# Patient Record
Sex: FEMALE | Race: WHITE | NOT HISPANIC OR LATINO | Employment: OTHER | URBAN - METROPOLITAN AREA
[De-identification: names, ages, dates, MRNs, and addresses within clinical notes are randomized per-mention and may not be internally consistent; named-entity substitution may affect disease eponyms.]

---

## 2017-09-13 ENCOUNTER — TRANSFERRED RECORDS (OUTPATIENT)
Dept: HEALTH INFORMATION MANAGEMENT | Facility: CLINIC | Age: 67
End: 2017-09-13

## 2017-09-19 ENCOUNTER — TRANSFERRED RECORDS (OUTPATIENT)
Dept: HEALTH INFORMATION MANAGEMENT | Facility: CLINIC | Age: 67
End: 2017-09-19

## 2017-09-20 ENCOUNTER — TRANSFERRED RECORDS (OUTPATIENT)
Dept: HEALTH INFORMATION MANAGEMENT | Facility: CLINIC | Age: 67
End: 2017-09-20

## 2017-09-25 ENCOUNTER — PRE VISIT (OUTPATIENT)
Dept: RADIATION ONCOLOGY | Facility: CLINIC | Age: 67
End: 2017-09-25

## 2017-09-25 NOTE — TELEPHONE ENCOUNTER
1.  Date/reason for appt: 10/3/2017, recurrent endometrial cancer    2.  Referring provider: Dr. Bullock    3.  Call to patient (Yes / No - short description): Yes, called patient to schedule consult    4.  Previous care at / records requested from: [facility] (consult only), [facility] (slides, surgery), [facility] (Clinic notes, biopsy) [facility]

## 2017-09-25 NOTE — TELEPHONE ENCOUNTER
Imaging from [facility] in pacs viewer - reports sent to HIM for scanning.

## 2017-09-26 DIAGNOSIS — C54.1 ENDOMETRIAL CANCER (H): Primary | ICD-10-CM

## 2017-09-28 NOTE — TELEPHONE ENCOUNTER
Received records from [facility] - sent to HIM for scanning. Also emailed to MD for review.

## 2017-09-29 NOTE — TELEPHONE ENCOUNTER
Received records from [facility] - sent to HIM for scanning. [facility] won't release records without signed DONYA. Patient will go over to [facility] clinic and sign release for them to send us records.

## 2017-10-02 PROCEDURE — 00000346 ZZHCL STATISTIC REVIEW OUTSIDE SLIDES TC 88321: Performed by: RADIOLOGY

## 2017-10-02 NOTE — TELEPHONE ENCOUNTER
Slides received from [facility] - sent to pathology for review. Records received from [facility] - sent to HIM for scanning.

## 2017-10-03 ENCOUNTER — OFFICE VISIT (OUTPATIENT)
Dept: RADIATION ONCOLOGY | Facility: CLINIC | Age: 67
End: 2017-10-03
Attending: RADIOLOGY
Payer: COMMERCIAL

## 2017-10-03 ENCOUNTER — HOSPITAL ENCOUNTER (OUTPATIENT)
Dept: MRI IMAGING | Facility: CLINIC | Age: 67
Discharge: HOME | End: 2017-10-03
Attending: RADIOLOGY | Admitting: RADIOLOGY
Payer: COMMERCIAL

## 2017-10-03 VITALS
WEIGHT: 254 LBS | BODY MASS INDEX: 38.49 KG/M2 | DIASTOLIC BLOOD PRESSURE: 71 MMHG | HEIGHT: 68 IN | SYSTOLIC BLOOD PRESSURE: 182 MMHG

## 2017-10-03 DIAGNOSIS — C54.1 ENDOMETRIAL CANCER (H): Primary | ICD-10-CM

## 2017-10-03 DIAGNOSIS — C54.1 ENDOMETRIAL CANCER (H): ICD-10-CM

## 2017-10-03 PROCEDURE — 99215 OFFICE O/P EST HI 40 MIN: CPT | Mod: 25 | Performed by: RADIOLOGY

## 2017-10-03 PROCEDURE — 25000128 H RX IP 250 OP 636: Performed by: RADIOLOGY

## 2017-10-03 PROCEDURE — A9585 GADOBUTROL INJECTION: HCPCS | Performed by: RADIOLOGY

## 2017-10-03 PROCEDURE — 72197 MRI PELVIS W/O & W/DYE: CPT

## 2017-10-03 RX ORDER — METFORMIN HCL 500 MG
500 TABLET, EXTENDED RELEASE 24 HR ORAL
COMMUNITY

## 2017-10-03 RX ORDER — PAROXETINE 40 MG/1
40 TABLET, FILM COATED ORAL
COMMUNITY
Start: 2017-05-16 | End: 2017-10-03

## 2017-10-03 RX ORDER — HYDROCHLOROTHIAZIDE 25 MG/1
25 TABLET ORAL
COMMUNITY
Start: 2017-02-17 | End: 2017-10-03

## 2017-10-03 RX ORDER — GADOBUTROL 604.72 MG/ML
10 INJECTION INTRAVENOUS ONCE
Status: COMPLETED | OUTPATIENT
Start: 2017-10-03 | End: 2017-10-03

## 2017-10-03 RX ORDER — HYDROCHLOROTHIAZIDE 12.5 MG/1
25 CAPSULE ORAL DAILY
COMMUNITY

## 2017-10-03 RX ORDER — METOPROLOL TARTRATE 50 MG
25 TABLET ORAL
COMMUNITY
Start: 2017-02-17 | End: 2017-10-03

## 2017-10-03 RX ORDER — ROSUVASTATIN CALCIUM 10 MG/1
10 TABLET, COATED ORAL
COMMUNITY
Start: 2017-05-18 | End: 2017-10-03

## 2017-10-03 RX ORDER — INSULIN GLARGINE 100 [IU]/ML
INJECTION, SOLUTION SUBCUTANEOUS
COMMUNITY
Start: 2017-04-02 | End: 2017-10-03

## 2017-10-03 RX ORDER — ASPIRIN 81 MG/1
TABLET ORAL
COMMUNITY
Start: 2006-12-12 | End: 2017-10-03

## 2017-10-03 RX ORDER — LEVOTHYROXINE SODIUM 25 UG/1
25 TABLET ORAL
COMMUNITY
Start: 2017-05-16 | End: 2017-10-03

## 2017-10-03 RX ORDER — METFORMIN HCL 500 MG
500 TABLET, EXTENDED RELEASE 24 HR ORAL
COMMUNITY
Start: 2017-05-16 | End: 2017-10-03

## 2017-10-03 RX ORDER — LORAZEPAM 0.5 MG/1
TABLET ORAL
COMMUNITY
Start: 2017-07-07 | End: 2017-10-03

## 2017-10-03 RX ORDER — MULTIVITAMIN
TABLET ORAL
COMMUNITY
Start: 2007-06-27 | End: 2017-10-03

## 2017-10-03 RX ORDER — LOSARTAN POTASSIUM 50 MG/1
50 TABLET ORAL
COMMUNITY
Start: 2017-05-30 | End: 2017-10-03

## 2017-10-03 RX ADMIN — SODIUM CHLORIDE 100 ML: 9 INJECTION, SOLUTION INTRAVENOUS at 07:59

## 2017-10-03 RX ADMIN — GADOBUTROL 10 ML: 604.72 INJECTION INTRAVENOUS at 07:50

## 2017-10-03 ASSESSMENT — ENCOUNTER SYMPTOMS
NERVOUS/ANXIOUS: 1
ORTHOPNEA: 0
BACK PAIN: 0
NAUSEA: 0
TREMORS: 0
SEIZURES: 0
DOUBLE VISION: 0
ABDOMINAL PAIN: 0
BLOOD IN STOOL: 0
SHORTNESS OF BREATH: 0
SPEECH CHANGE: 0
HEMATURIA: 0
WEIGHT LOSS: 0
MYALGIAS: 0
PND: 0
PHOTOPHOBIA: 0
CONSTIPATION: 0
HEMOPTYSIS: 0
TINGLING: 0
POLYDIPSIA: 0
VOMITING: 0
SINUS PAIN: 0
DIZZINESS: 1
NECK PAIN: 0
MEMORY LOSS: 0
INSOMNIA: 0
STRIDOR: 0
DEPRESSION: 1
LOSS OF CONSCIOUSNESS: 0
FALLS: 0
FLANK PAIN: 0
BRUISES/BLEEDS EASILY: 0
FEVER: 0
WEAKNESS: 0
PALPITATIONS: 0
HALLUCINATIONS: 0
CLAUDICATION: 0
EYE DISCHARGE: 0
HEARTBURN: 0
CHILLS: 0
DIAPHORESIS: 0
HEADACHES: 0
SPUTUM PRODUCTION: 0
COUGH: 0
FREQUENCY: 0
BLURRED VISION: 0
SENSORY CHANGE: 0
DIARRHEA: 0
SORE THROAT: 0
ROS GI COMMENTS: GASSY
DYSURIA: 0
EYE PAIN: 0
WHEEZING: 0
EYE REDNESS: 0
FOCAL WEAKNESS: 0

## 2017-10-03 ASSESSMENT — LIFESTYLE VARIABLES: SUBSTANCE_ABUSE: 0

## 2017-10-03 NOTE — MR AVS SNAPSHOT
"              After Visit Summary   10/3/2017    Ginger Diaz    MRN: [id number]           Patient Information     Date Of Birth          1950        Visit Information        Provider Department      10/3/2017 1:30 PM Ema Valera MD Radiation Oncology Clinic        Today's Diagnoses     Endometrial cancer (H)    -  1       Follow-ups after your visit        Who to contact     Please call your clinic at [phone] to:    Ask questions about your health    Make or cancel appointments    Discuss your medicines    Learn about your test results    Speak to your doctor   If you have compliments or concerns about an experience at your clinic, or if you wish to file a complaint, please contact [facility] Patient Relations at [phone] or email us at [email or web address]         Additional Information About Your Visit        MyChart Information     Spotie is an electronic gateway that provides easy, online access to your medical records. With Spotie, you can request a clinic appointment, read your test results, renew a prescription or communicate with your care team.     To sign up for [employer] visit the website at [email or web address]   You will be asked to enter the access code listed below, as well as some personal information. Please follow the directions to create your username and password.     Your access code is: [email or web address]  Expires: 2018 10:36 AM     Your access code will  in 90 days. If you need help or a new code, please contact your [facility] or call [phone] for assistance.        Care EveryWhere ID     This is your Care EveryWhere ID. This could be used by other organizations to access your [facility] medical records  [id number]        Your Vitals Were     Height BMI (Body Mass Index)                1.727 m (5' 8\") 38.62 kg/m2           Blood Pressure from Last 3 Encounters:   10/03/17 182/71    " Weight from Last 3 Encounters:   10/03/17 115.2 kg (254 lb)              Today, you had the following     No orders found for display         Today's Medication Changes          These changes are accurate as of: 10/3/17 11:59 PM.  If you have any questions, ask your nurse or doctor.               These medicines have changed or have updated prescriptions.        Dose/Directions    ATIVAN PO   This may have changed:  Another medication with the same name was removed. Continue taking this medication, and follow the directions you see here.   Changed by:  Ema Valera MD        Dose:  0.5 mg   Take 0.5 mg by mouth as needed for anxiety   Refills:  0       COZAAR PO   This may have changed:  Another medication with the same name was removed. Continue taking this medication, and follow the directions you see here.   Changed by:  Ema Valera MD        Dose:  50 mg   Take 50 mg by mouth   Refills:  0       hydrochlorothiazide 12.5 MG capsule   Commonly known as:  MICROZIDE   This may have changed:  Another medication with the same name was removed. Continue taking this medication, and follow the directions you see here.   Changed by:  Ema Valera MD        Dose:  25 mg   Take 25 mg by mouth daily   Refills:  0       LANTUS SOLOSTAR SC   This may have changed:  Another medication with the same name was removed. Continue taking this medication, and follow the directions you see here.   Changed by:  Ema Valera MD        Dose:  30 Units   Inject 30 Units Subcutaneous   Refills:  0       metFORMIN 500 MG 24 hr tablet   Commonly known as:  GLUCOPHAGE-XR   This may have changed:  Another medication with the same name was removed. Continue taking this medication, and follow the directions you see here.   Changed by:  Ema Valera MD        Dose:  500 mg   Take 500 mg by mouth 2 times daily (with meals)   Refills:  0       METOPROLOL TARTRATE PO   This may have changed:  Another  medication with the same name was removed. Continue taking this medication, and follow the directions you see here.   Changed by:  Ema Valera MD        Dose:  50 mg   Take 50 mg by mouth   Refills:  0       MULTIPLE VITAMIN PO   This may have changed:  Another medication with the same name was removed. Continue taking this medication, and follow the directions you see here.   Changed by:  Ema Valera MD        Refills:  0       PAXIL PO   This may have changed:  Another medication with the same name was removed. Continue taking this medication, and follow the directions you see here.   Changed by:  Ema Valera MD        Dose:  40 mg   Take 40 mg by mouth daily   Refills:  0         Stop taking these medicines if you haven't already. Please contact your care team if you have questions.     aspirin EC 81 MG EC tablet   Stopped by:  Ema Valera MD           levothyroxine 25 MCG tablet   Commonly known as:  SYNTHROID/LEVOTHROID   Stopped by:  Ema Valera MD           rosuvastatin 10 MG tablet   Commonly known as:  CRESTOR   Stopped by:  Ema Valera MD                    Primary Care Provider Office Phone # Fax #    Casey Baez [phone]       [facility] [address] [place] [address]        Equal Access to Services     [facility] HospitalPRINCESS AH: Hadii tricia vides hadasho Soomaali, waaxda luqadaha, qaybta kaalmada adeegyada, josé luis dillon. So [facility] [phone].    ATENCIÓN: Si habla español, tiene a jenkins disposición servicios gratuitos de asistencia lingüística. Llame al [phone].    We comply with applicable federal civil rights laws and [place] laws. We do not discriminate on the basis of race, color, national origin, age, disability, sex, sexual orientation, or gender identity.            Thank you!     Thank you for choosing [facility]  for your care. Our goal is always to provide you  with excellent care. Hearing back from our patients is one way we can continue to improve our services. Please take a few minutes to complete the written survey that you may receive in the mail after your visit with us. Thank you!             Your Updated Medication List - Protect others around you: Learn how to safely use, store and throw away your medicines at www.disposemymeds.org.          This list is accurate as of: 10/3/17 11:59 PM.  Always use your most recent med list.                   Brand Name Dispense Instructions for use Diagnosis    ASPIRIN PO      Take 81 mg by mouth        ATIVAN PO      Take 0.5 mg by mouth as needed for anxiety        COZAAR PO      Take 50 mg by mouth        hydrochlorothiazide 12.5 MG capsule    MICROZIDE     Take 25 mg by mouth daily        LAMICTAL PO      Take 100 mg by mouth 2 times daily 100 mg in the AM, 200 mg at HS        LANTUS SOLOSTAR SC      Inject 30 Units Subcutaneous        metFORMIN 500 MG 24 hr tablet    GLUCOPHAGE-XR     Take 500 mg by mouth 2 times daily (with meals)        METOPROLOL TARTRATE PO      Take 50 mg by mouth        MULTIPLE VITAMIN PO           PAXIL PO      Take 40 mg by mouth daily

## 2017-10-03 NOTE — PROGRESS NOTES
HPI  INITIAL PATIENT ASSESSMENT    Diagnosis: Endometrial Cancer    Prior radiation therapy: None    Prior chemotherapy: None    Prior hormonal therapy:No    Pain Eval:  Denies    Psychosocial  Living arrangements: Lives with   Fall Risk: independent   referral needs: Not needed    Advanced Directive: No  Implantable Cardiac Device? No    Onset of menarche: 12  LMP: No LMP recorded.  Onset of menopause: 50  Abnormal vaginal bleeding/discharge: No  Are you pregnant? No      Nurse face-to-face time: Level 5:  over 15 min face to face time    Review of Systems   Constitutional: Positive for malaise/fatigue. Negative for chills, diaphoresis, fever and weight loss.   HENT: Positive for hearing loss. Negative for congestion, ear discharge, ear pain, nosebleeds, sinus pain, sore throat and tinnitus.         Left ear   Eyes: Negative for blurred vision, double vision, photophobia, pain, discharge and redness.        Cataract surgery left eye   Respiratory: Negative for cough, hemoptysis, sputum production, shortness of breath, wheezing and stridor.    Cardiovascular: Negative for chest pain, palpitations, orthopnea, claudication, leg swelling and PND.   Gastrointestinal: Negative for abdominal pain, blood in stool, constipation, diarrhea, heartburn, melena, nausea and vomiting.        Gassy   Genitourinary: Negative for dysuria, flank pain, frequency, hematuria and urgency.   Musculoskeletal: Negative for back pain, falls, joint pain, myalgias and neck pain.   Skin: Negative for itching and rash.   Neurological: Positive for dizziness. Negative for tingling, tremors, sensory change, speech change, focal weakness, seizures, loss of consciousness, weakness and headaches.        Menieres   Endo/Heme/Allergies: Negative for environmental allergies and polydipsia. Does not bruise/bleed easily.   Psychiatric/Behavioral: Positive for depression. Negative for hallucinations, memory loss, substance abuse and  suicidal ideas. The patient is nervous/anxious. The patient does not have insomnia.

## 2017-10-03 NOTE — LETTER
10/3/2017       RE: Ginger Diaz  [address]  [place] [address]     Dear Colleague,    Thank you for referring your patient, Ginger Diaz, to the [facility]. Please see a copy of my visit note below.      HPI  INITIAL PATIENT ASSESSMENT    Diagnosis: Endometrial Cancer    Prior radiation therapy: None    Prior chemotherapy: None    Prior hormonal therapy:No    Pain Eval:  Denies    Psychosocial  Living arrangements: Lives with   Fall Risk: independent   referral needs: Not needed    Advanced Directive: No  Implantable Cardiac Device? No    Onset of menarche: 12  LMP: No LMP recorded.  Onset of menopause: 50  Abnormal vaginal bleeding/discharge: No  Are you pregnant? No      Nurse face-to-face time: Level 5:  over 15 min face to face time    Review of Systems   Constitutional: Positive for malaise/fatigue. Negative for chills, diaphoresis, fever and weight loss.   HENT: Positive for hearing loss. Negative for congestion, ear discharge, ear pain, nosebleeds, sinus pain, sore throat and tinnitus.         Left ear   Eyes: Negative for blurred vision, double vision, photophobia, pain, discharge and redness.        Cataract surgery left eye   Respiratory: Negative for cough, hemoptysis, sputum production, shortness of breath, wheezing and stridor.    Cardiovascular: Negative for chest pain, palpitations, orthopnea, claudication, leg swelling and PND.   Gastrointestinal: Negative for abdominal pain, blood in stool, constipation, diarrhea, heartburn, melena, nausea and vomiting.        Gassy   Genitourinary: Negative for dysuria, flank pain, frequency, hematuria and urgency.   Musculoskeletal: Negative for back pain, falls, joint pain, myalgias and neck pain.   Skin: Negative for itching and rash.   Neurological: Positive for dizziness. Negative for tingling, tremors, sensory change, speech change, focal weakness, seizures, loss of consciousness, weakness and headaches.         Menieres   Endo/Heme/Allergies: Negative for environmental allergies and polydipsia. Does not bruise/bleed easily.   Psychiatric/Behavioral: Positive for depression. Negative for hallucinations, memory loss, substance abuse and suicidal ideas. The patient is nervous/anxious. The patient does not have insomnia.                  RADIATION ONCOLOGY CONSULT NOTE  Date of Visit: Oct 3, 2017    Ginger Diaz  MRN: [id number]  : 1950    Ginger Diaz is being seen today for initial consultation at the request of Dr. Cristian Bullock for consideration of radiation therapy for recurrent endometrial cancer involving the vaginal cuff. All pertinent labs, imaging, and pathology findings have been reviewed.     HISTORY OF PRESENT ILLNESS:  Ms. 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 She was initially diagnosed in  and was treated with robotic-assisted total laparoscopic hysterectomy, bilateral salpingo-oophorectomy, and pelvic lymph node dissection on 2011.  Pathology revealed grade 1 endometrioid adenocarcinoma of the endometrium, measuring up to 4.5 cm in size, with 1.5 of 2.1 cm myometrial invasion, and with focal lymphovascular space invasion. 0 of 20 pelvic lymph nodes were involved with carcinoma. Margins were negative by at least 2 cm, and peritoneal washings were negative for malignancy.    Given her age, the size of the tumor, lymphovascular space invasion, and deep myometrial invasion, she was recommended to undergo adjuvant therapy with vaginal cuff brachytherapy. However, she declined adjuvant radiation therapy. Approximately 2-3 months ago, she reports that she developed vaginal spotting and intermittent abdominal and low back pain. She saw Dr. Greta Seay on 17, and pelvic exam revealed a polypoid lesion in the vaginal cuff. Dr. Seay biopsy this lesion and pathology was consistent with FIGO grade 1 recurrent  endometrial cancer. She underwent CT chest abdomen and pelvis on 9/13/17, which showed a 5.5 x 4 x 6 cm mass at the vaginal cuff with abutment of the bladder and possible invasion and abutment of the sigmoid colon.    She met with Dr. Goldy Bullock of [facility] radiation oncology on 9/19/2017. Dr. Bullock recommended PET/CT and MRI of the pelvis to complete staging, and discussed the possibility of treatment with surgery with possible adjuvant radiation versus definitive chemoradiation using external beam and a brachytherapy boost. She was referred to our clinic for discussion of brachytherapy options. She underwent PET CT scan on 9/20/2017, which showed an irregular infiltrating soft tissue mass in the deep pelvis at the vaginal cuff intensely FDG avid, consistent with recurrent/residual malignancy.  The mass was seen to directly abut the wall of the bladder, as well as a short segment of sigmoid colon. Adherence or invasion into the sigmoid colon cannot be ruled out. No concerning lymph nodes or lesions consistent with metastatic disease were seen.    She underwent MRI of the pelvis earlier today, which again showed the vaginal cuff mass extending into the superior third of the vagina.  Again it was visualized to abut the posterior superior wall of the urinary bladder and a short segment of the adjacent sigmoid colon. MRI does show evidence of tethering with loss of intervening fat planes, however mucosal involvement is unclear.      On our visit today, she reports that she has had some stress incontinence, primarily with coughing or sneezing or moving, which has been slightly worse than baseline in the last 2 weeks but seems to be improving over the past few days. She reports occasional low back pain and left lower quadrant abdominal pain. She also notes intermittent vaginal bleeding and occasional clots in the toilet bowl.     CHEMOTHERAPY HISTORY: None    PAST RADIATION THERAPY HISTORY: None    PACEMAKER:  None    PREGNANCY STATUS: S/P hysterectomy    PAST MEDICAL HISTORY:  Past Medical History:   Diagnosis Date     Agoraphobia with panic disorder      Asthma      Bipolar disorder (H)      Endometrial cancer (H)      Glaucoma      History of Bell's palsy      Hyperlipidemia      Hypertension      Hypothyroid      Meniere's disease      Peripheral polyneuropathy      SVT (supraventricular tachycardia) (H)      Type 2 diabetes mellitus (H)      PAST SURGICAL HISTORY:  Past Surgical History:   Procedure Laterality Date     ADENOIDECTOMY       CATARACT IOL, RT/LT       HYSTERECTOMY RADICAL, SALPINGO-OOPHORECTOMY       TONSILLECTOMY       ALLERGIES:  Allergies as of 10/03/2017 - Roger as Reviewed 10/03/2017   Allergen Reaction Noted     Lisinopril  10/03/2017     Sulfa drugs  10/03/2017     MEDICATIONS:  Medication Sig     ASPIRIN PO Take 81 mg by mouth     Losartan Potassium (COZAAR PO) Take 50 mg by mouth     hydrochlorothiazide (MICROZIDE) 12.5 MG capsule Take 25 mg by mouth daily     LamoTRIgine (LAMICTAL PO) Take 100 mg by mouth 2 times daily 100 mg in the AM, 200 mg at HS     Insulin Glargine (LANTUS SOLOSTAR SC) Inject 30 Units Subcutaneous     LORazepam (ATIVAN PO) Take 0.5 mg by mouth as needed for anxiety     metFORMIN (GLUCOPHAGE-XR) 500 MG 24 hr tablet Take 500 mg by mouth 2 times daily (with meals)     METOPROLOL TARTRATE PO Take 50 mg by mouth     PARoxetine HCl (PAXIL PO) Take 40 mg by mouth daily     MULTIPLE VITAMIN PO       FAMILY HISTORY:  Family History   Problem Relation Age of Onset     Breast Cancer Mother      SOCIAL HISTORY:  Social History     Marital status:      Spouse name: N/A     Number of children: N/A     Years of education: N/A     Social History Main Topics     Smoking status: Never Smoker     Smokeless tobacco: Never Used     Alcohol use No     Drug use: No     Sexual activity: Not on file     REVIEW OF SYSTEMS: A 12 point review of systems was obtained. Pertinent findings are  "noted in the HPI and are otherwise unremarkable.     PHYSICAL EXAM:  VITALS: /71  Ht 1.727 m (5' 8\")  Wt 115.2 kg (254 lb)  BMI 38.62 kg/m2  GEN: Appears well, alert, oriented, and in NAD  HEENT: EOMI, normal conjunctiva, MMM, no thrush  NECK: Supple, full ROM, no cervical or supraclavicular lymphadenopathy  CV: RRR, no murmurs/rubs/gallops, warm and well-perfused  RESP: CTAB, no wheezes/rales/rhonchi, breathing comfortably on room air  : Deferred  SKIN: Normal color and turgor  NEURO: No focal deficits, CN 3-12 grossly intact, normal and symmetric motor and sensory exam in bilateral upper and lower extremities, normal gait  PSYCH: Appropriate mood and affect    ECOG PERFORMANCE STATUS: 1    IMAGING:  EXAMINATION: MR PELVIS W/O & W CONTRAST, 10/3/2017 9:07 AM  COMPARISON: PET/CT 9/20/2017; ultrasound 1/27/2011.     FINDINGS: Postoperative changes of hysterectomy and bilateral  salpingo-oophorectomy.     There is a lobulated mixed, signal intensity, enhancing mass at the  vaginal cuff which extends into the upper third of the vagina  demonstrating heterogeneous enhancement with scattered areas of  hypoenhancement, likely representing necrosis, measuring 4.2 x 5.5 x  4.2 cm (AP by TR by a side).  Associated diffusion restriction. When  compared to recent CT and PET CT, this mass does not appear to have  appreciably changed in size. Avid FDG activity was seen at that time.  The mass continues to abut the superior posterior wall of the urinary  bladder (series 9, image 19; series 17, image 33 and series 18, image  29) as well as a short segment of the sigmoid colon which appears to  be adherent to the mass (series 7, image 16; series 17, image 36 and  series 15, images 18-23); the intervening fat planes have been  obliterated.     No dilated loops of bowel. Colonic diverticulosis. No free fluid in  the pelvis. Bilateral inguinal lymph nodes, not enlarged by size  criteria and without FDG avidity on prior PET. " No pelvic  lymphadenopathy. No suspicious lesions in the bones.         IMPRESSION: In this patient with history of endometrial carcinoma  status post hysterectomy:  1. Biopsy-proven endometrial cancer recurrence at the vaginal cuff  extends into the superior third of the vagina.  2. The mass abuts the posterior-superior wall of the urinary bladder  as well as a short segment of the adjacent sigmoid colon where there  is evidence tethering. Intervening fat planes have been lost and  findings are concerning for invasion.  3. No pelvic lymphadenopathy.  4. Colonic diverticulosis.    IMPRESSION:  Ms. Diaz is a 67 year old woman with vaginal cuff recurrence of endometrial cancer, initially grade 1, FIGO IB. The mass as visualized on PET/CT and pelvic MRI abuts the bladder without clear mucosal involvement, and is also immediately adjacent to the sigmoid colon with likely tethering by imaging findings, but again without certainty of mucosal invasion.    RECOMMENDATION:  We discussed at length with Ms. Diaz possible treatment options for recurrent endometrial cancer, which include radiotherapy with brachytherapy, surgery or chemotherapy alone. At this time, we have concerns about possible sigmoid invasion which makes delivering a curative dose difficult with a significant increase in the risk of enterovaginal fistula. Prior to recommending a course of chemoradiation, further staging with cystoscopy and colonoscopy is indicated to ascertain presence of tumor invasion. In the presence of stage IV disease with large bowel involvement, she may benefit from extirpative surgery as a first approach given the significant risks of chemoradiation and geometric limits of the interstitial implant. If she does not have large bowel involvement, proceeding with definitive chemoradiation can be considered.    We explained our rationale, as well as the logistics, risks, benefits, and alternatives to Ms. Diaz. We encouraged Ms.  Joe to ask questions, which we answered to the best of our ability.     The patient was seen and discussed with staff, Dr. Valera.   Thank you for involving us in the care of this patient.  Please feel free to contact us with questions or concerns at any time.    Brian Cheatham MD PGY-3  Radiation Oncology Resident, [facility]  Phone: [phone]    Ms. Diaz was seen and examined by me. Note above by Dr. Cheatham was reviewed and edited by me and reflects our mutual findings and plan of care. We discussed our recommendations with Dr. Bullock by phone.  Ema Valera MD  Department of Radiation Oncology  [facility]

## 2017-10-04 NOTE — PROGRESS NOTES
RADIATION ONCOLOGY CONSULT NOTE  Date of Visit: Oct 3, 2017    Ginger Diaz  MRN: [id number]  : 1950    Ginger Diaz is being seen today for initial consultation at the request of Dr. Cristian Bullock for consideration of radiation therapy for recurrent endometrial cancer involving the vaginal cuff. All pertinent labs, imaging, and pathology findings have been reviewed.     HISTORY OF PRESENT ILLNESS:  Ms. 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 She was initially diagnosed in  and was treated with robotic-assisted total laparoscopic hysterectomy, bilateral salpingo-oophorectomy, and pelvic lymph node dissection on 2011.  Pathology revealed grade 1 endometrioid adenocarcinoma of the endometrium, measuring up to 4.5 cm in size, with 1.5 of 2.1 cm myometrial invasion, and with focal lymphovascular space invasion. 0 of 20 pelvic lymph nodes were involved with carcinoma. Margins were negative by at least 2 cm, and peritoneal washings were negative for malignancy.    Given her age, the size of the tumor, lymphovascular space invasion, and deep myometrial invasion, she was recommended to undergo adjuvant therapy with vaginal cuff brachytherapy. However, she declined adjuvant radiation therapy. Approximately 2-3 months ago, she reports that she developed vaginal spotting and intermittent abdominal and low back pain. She saw Dr. Greta Seay on 17, and pelvic exam revealed a polypoid lesion in the vaginal cuff. Dr. Seay biopsy this lesion and pathology was consistent with FIGO grade 1 recurrent endometrial cancer. She underwent CT chest abdomen and pelvis on 17, which showed a 5.5 x 4 x 6 cm mass at the vaginal cuff with abutment of the bladder and possible invasion and abutment of the sigmoid colon.    She met with Dr. Goldy Bullock of [facility] radiation oncology on 2017. Dr. Bullock recommended PET/CT and MRI of the  pelvis to complete staging, and discussed the possibility of treatment with surgery with possible adjuvant radiation versus definitive chemoradiation using external beam and a brachytherapy boost. She was referred to our clinic for discussion of brachytherapy options. She underwent PET CT scan on 9/20/2017, which showed an irregular infiltrating soft tissue mass in the deep pelvis at the vaginal cuff intensely FDG avid, consistent with recurrent/residual malignancy.  The mass was seen to directly abut the wall of the bladder, as well as a short segment of sigmoid colon. Adherence or invasion into the sigmoid colon cannot be ruled out. No concerning lymph nodes or lesions consistent with metastatic disease were seen.    She underwent MRI of the pelvis earlier today, which again showed the vaginal cuff mass extending into the superior third of the vagina.  Again it was visualized to abut the posterior superior wall of the urinary bladder and a short segment of the adjacent sigmoid colon. MRI does show evidence of tethering with loss of intervening fat planes, however mucosal involvement is unclear.      On our visit today, she reports that she has had some stress incontinence, primarily with coughing or sneezing or moving, which has been slightly worse than baseline in the last 2 weeks but seems to be improving over the past few days. She reports occasional low back pain and left lower quadrant abdominal pain. She also notes intermittent vaginal bleeding and occasional clots in the toilet bowl.     CHEMOTHERAPY HISTORY: None    PAST RADIATION THERAPY HISTORY: None    PACEMAKER: None    PREGNANCY STATUS: S/P hysterectomy    PAST MEDICAL HISTORY:  Past Medical History:   Diagnosis Date     Agoraphobia with panic disorder      Asthma      Bipolar disorder (H)      Endometrial cancer (H)      Glaucoma      History of Bell's palsy      Hyperlipidemia      Hypertension      Hypothyroid      Meniere's disease      Peripheral  "polyneuropathy      SVT (supraventricular tachycardia) (H)      Type 2 diabetes mellitus (H)      PAST SURGICAL HISTORY:  Past Surgical History:   Procedure Laterality Date     ADENOIDECTOMY       CATARACT IOL, RT/LT       HYSTERECTOMY RADICAL, SALPINGO-OOPHORECTOMY       TONSILLECTOMY       ALLERGIES:  Allergies as of 10/03/2017 - Roger as Reviewed 10/03/2017   Allergen Reaction Noted     Lisinopril  10/03/2017     Sulfa drugs  10/03/2017     MEDICATIONS:  Medication Sig     ASPIRIN PO Take 81 mg by mouth     Losartan Potassium (COZAAR PO) Take 50 mg by mouth     hydrochlorothiazide (MICROZIDE) 12.5 MG capsule Take 25 mg by mouth daily     LamoTRIgine (LAMICTAL PO) Take 100 mg by mouth 2 times daily 100 mg in the AM, 200 mg at HS     Insulin Glargine (LANTUS SOLOSTAR SC) Inject 30 Units Subcutaneous     LORazepam (ATIVAN PO) Take 0.5 mg by mouth as needed for anxiety     metFORMIN (GLUCOPHAGE-XR) 500 MG 24 hr tablet Take 500 mg by mouth 2 times daily (with meals)     METOPROLOL TARTRATE PO Take 50 mg by mouth     PARoxetine HCl (PAXIL PO) Take 40 mg by mouth daily     MULTIPLE VITAMIN PO       FAMILY HISTORY:  Family History   Problem Relation Age of Onset     Breast Cancer Mother      SOCIAL HISTORY:  Social History     Marital status:      Spouse name: N/A     Number of children: N/A     Years of education: N/A     Social History Main Topics     Smoking status: Never Smoker     Smokeless tobacco: Never Used     Alcohol use No     Drug use: No     Sexual activity: Not on file     REVIEW OF SYSTEMS: A 12 point review of systems was obtained. Pertinent findings are noted in the HPI and are otherwise unremarkable.     PHYSICAL EXAM:  VITALS: /71  Ht 1.727 m (5' 8\")  Wt 115.2 kg (254 lb)  BMI 38.62 kg/m2  GEN: Appears well, alert, oriented, and in NAD  HEENT: EOMI, normal conjunctiva, MMM, no thrush  NECK: Supple, full ROM, no cervical or supraclavicular lymphadenopathy  CV: RRR, no " murmurs/rubs/gallops, warm and well-perfused  RESP: CTAB, no wheezes/rales/rhonchi, breathing comfortably on room air  : Deferred  SKIN: Normal color and turgor  NEURO: No focal deficits, CN 3-12 grossly intact, normal and symmetric motor and sensory exam in bilateral upper and lower extremities, normal gait  PSYCH: Appropriate mood and affect    ECOG PERFORMANCE STATUS: 1    IMAGING:  EXAMINATION: MR PELVIS W/O & W CONTRAST, 10/3/2017 9:07 AM  COMPARISON: PET/CT 9/20/2017; ultrasound 1/27/2011.     FINDINGS: Postoperative changes of hysterectomy and bilateral  salpingo-oophorectomy.     There is a lobulated mixed, signal intensity, enhancing mass at the  vaginal cuff which extends into the upper third of the vagina  demonstrating heterogeneous enhancement with scattered areas of  hypoenhancement, likely representing necrosis, measuring 4.2 x 5.5 x  4.2 cm (AP by TR by a side).  Associated diffusion restriction. When  compared to recent CT and PET CT, this mass does not appear to have  appreciably changed in size. Avid FDG activity was seen at that time.  The mass continues to abut the superior posterior wall of the urinary  bladder (series 9, image 19; series 17, image 33 and series 18, image  29) as well as a short segment of the sigmoid colon which appears to  be adherent to the mass (series 7, image 16; series 17, image 36 and  series 15, images 18-23); the intervening fat planes have been  obliterated.     No dilated loops of bowel. Colonic diverticulosis. No free fluid in  the pelvis. Bilateral inguinal lymph nodes, not enlarged by size  criteria and without FDG avidity on prior PET. No pelvic  lymphadenopathy. No suspicious lesions in the bones.         IMPRESSION: In this patient with history of endometrial carcinoma  status post hysterectomy:  1. Biopsy-proven endometrial cancer recurrence at the vaginal cuff  extends into the superior third of the vagina.  2. The mass abuts the posterior-superior wall of  the urinary bladder  as well as a short segment of the adjacent sigmoid colon where there  is evidence tethering. Intervening fat planes have been lost and  findings are concerning for invasion.  3. No pelvic lymphadenopathy.  4. Colonic diverticulosis.    IMPRESSION:  Ms. Diaz is a 67 year old woman with vaginal cuff recurrence of endometrial cancer, initially grade 1, FIGO IB. The mass as visualized on PET/CT and pelvic MRI abuts the bladder without clear mucosal involvement, and is also immediately adjacent to the sigmoid colon with likely tethering by imaging findings, but again without certainty of mucosal invasion.    RECOMMENDATION:  We discussed at length with Ms. Diaz possible treatment options for recurrent endometrial cancer, which include radiotherapy with brachytherapy, surgery or chemotherapy alone. At this time, we have concerns about possible sigmoid invasion which makes delivering a curative dose difficult with a significant increase in the risk of enterovaginal fistula. Prior to recommending a course of chemoradiation, further staging with cystoscopy and colonoscopy is indicated to ascertain presence of tumor invasion. In the presence of stage IV disease with large bowel involvement, she may benefit from extirpative surgery as a first approach given the significant risks of chemoradiation and geometric limits of the interstitial implant. If she does not have large bowel involvement, proceeding with definitive chemoradiation can be considered.    We explained our rationale, as well as the logistics, risks, benefits, and alternatives to Ms. Diaz. We encouraged Ms. Diaz to ask questions, which we answered to the best of our ability.     The patient was seen and discussed with staff, Dr. Valera.   Thank you for involving us in the care of this patient.  Please feel free to contact us with questions or concerns at any time.    Brian Cheatham MD PGY-3  Radiation Oncology Resident, [facility]  [place]  Phone: [phone]    Ms. Diaz was seen and examined by me. Note above by Dr. Cheatham was reviewed and edited by me and reflects our mutual findings and plan of care. We discussed our recommendations with Dr. Bullock by phone.  Ema Valera MD  Department of Radiation Oncology  [facility]

## 2017-10-05 LAB — COPATH REPORT: NORMAL

## 2017-11-14 DIAGNOSIS — C54.1 RECURRENT CARCINOMA OF ENDOMETRIUM (H): Primary | ICD-10-CM

## 2017-11-15 ENCOUNTER — HOSPITAL ENCOUNTER (INPATIENT)
Facility: CLINIC | Age: 67
Setting detail: SURGERY ADMIT
End: 2017-11-15
Attending: RADIOLOGY | Admitting: RADIOLOGY
Payer: COMMERCIAL

## 2017-11-15 ENCOUNTER — PRE VISIT (OUTPATIENT)
Dept: RADIATION ONCOLOGY | Facility: CLINIC | Age: 67
End: 2017-11-15

## 2017-11-15 NOTE — TELEPHONE ENCOUNTER
1.  Date/reason for appt: 12/12/2017, radiation treatment for endometrial cancer    2.  Referring provider: Dr. Bullock    3.  Call to patient (Yes / No - short description): No, nurse calling patient with appointment information    4.  Previous care at / records requested from: [facility] (faxed request for EBRT records & DICOM disc), will request treatment summary when patient is done with EBRT

## 2017-11-16 ENCOUNTER — TELEPHONE (OUTPATIENT)
Dept: RADIATION ONCOLOGY | Facility: CLINIC | Age: 67
End: 2017-11-16

## 2017-11-16 NOTE — TELEPHONE ENCOUNTER
Called patient regarding upcoming brachytherapy appointments and sent a copy of the schedule as well.  Pt will stop by clinic after her MRI appointment to further discuss the procedure with Dr Valera. Pt denies any further questions or concerns at this time.

## 2017-12-05 ENCOUNTER — ONCOLOGY VISIT (OUTPATIENT)
Dept: ONCOLOGY | Facility: CLINIC | Age: 67
End: 2017-12-05
Attending: OBSTETRICS & GYNECOLOGY
Payer: COMMERCIAL

## 2017-12-05 VITALS
BODY MASS INDEX: 36.9 KG/M2 | OXYGEN SATURATION: 95 % | DIASTOLIC BLOOD PRESSURE: 78 MMHG | RESPIRATION RATE: 18 BRPM | HEIGHT: 68 IN | WEIGHT: 243.5 LBS | TEMPERATURE: 98.1 F | HEART RATE: 70 BPM | SYSTOLIC BLOOD PRESSURE: 143 MMHG

## 2017-12-05 DIAGNOSIS — C54.1 RECURRENT CARCINOMA OF ENDOMETRIUM (H): Primary | ICD-10-CM

## 2017-12-05 PROCEDURE — 99212 OFFICE O/P EST SF 10 MIN: CPT

## 2017-12-05 PROCEDURE — 99204 OFFICE O/P NEW MOD 45 MIN: CPT | Mod: ZP | Performed by: OBSTETRICS & GYNECOLOGY

## 2017-12-05 PROCEDURE — 99212 OFFICE O/P EST SF 10 MIN: CPT | Mod: ZF

## 2017-12-05 ASSESSMENT — PAIN SCALES - GENERAL: PAINLEVEL: NO PAIN (0)

## 2017-12-05 NOTE — PROGRESS NOTES
Consult Notes on Referred Patient    Date: 2017       Dr. Ema Valera MD  [address]  [place] [address]       RE: Ginger Diaz  : 1950  DEBBIE: 2017    Dear Dr. Ema Valera:    I had the pleasure of seeing your patient Ginger Diaz here at the Gynecologic Cancer Clinic at the [facility] on 2017.  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 Final pathology demonstrated grade 1 endometrial cancer, 4.5 cm in size, with 71% myometrial invasion, and some lymphovascular space invasion. Ovary, fallopian tubes and 20 lymph nodes and washings were negative. She was recommended to undergo adjuvant radiation therapy with vaginal brachytherapy; however, she declined.     She was seen on 2017 at [facility] by Dr. Greta Seay for vaginal spotting and abdominal cramping. On exam she was noted to have polypoid lesion at vaginal cuff which was biopsied and returned consistent with recurrent endometrial cancer. She had extensive discussion with Dr. Seay on treatment options including surgical resection followed by adjuvant therapy. She was not interested in surgery at the time and desired to discuss radiation therapy with radiation oncology.    Given these findings she was subsequently sent to the Gynecologic Cancer Clinic for new patient consultation.     Cancer History  2011: Underwent robotic-assisted total laparoscopic hysterectomy, bilateral salpingo-oopherectomy, and staging. Pathology revealed grade 1 endometrioid adenocarcinoma of the endometrium, measuring up to 4.5 cm in size, with 1.5 of 2.1 cm myometrial invasion, and with focal lymphovascular space invasion. 0 of 20 pelvic lymph nodes were  involved with carcinoma. Margins were negative by at least 2 cm, and peritoneal washings were negative for malignancy.    Recommended to undergo adjuvant therapy with vaginal cuff brachytherapy but patient declined.    9/13/2017: vaginal spotting and abdominal pain. Polypoid lesion on vaginal cuff. Pathology consistent with grade 1 recurrent endometrial cancer.     CT CAP: 5.5 x 4 x 6 cm lobulated soft tissue mass at vaginal cuff, posterior to bladder but concerning for possible invasion of bladder. Intimately associated with sigmoid colon.      9/20/2017: PET CT     10/3/2017: MRI -   1. Lobulated mixed, signal intensity, enhancing mass at the vaginal cuff which extends into the upper third of the vagina demonstrating heterogeneous enhancement with scattered areas of hypoenhancement, likely representing necrosis, measuring 4.2 x 5.5 x 4.2 cm.   2. The mass abuts the posterior-superior wall of the urinary bladder as well as a short segment of the adjacent sigmoid colon where there is evidence tethering. Intervening fat planes have been lost and findings are concerning for invasion.  3. No pelvic lymphadenopathy.  4. Colonic diverticulosis.    Review of Systems:    Systemic           no weight changes; no fever; no chills; no night sweats; no appetite changes  Skin           no rashes, or lesions  Eye           no irritation; no changes in vision  Kary-Laryngeal           no dysphagia; no hoarseness   Pulmonary    no cough; no shortness of breath  Cardiovascular    no chest pain; no palpitations  Gastrointestinal    no diarrhea; no constipation; no abdominal pain; no changes in bowel  habits; no blood in stool  Genitourinary   no urinary frequency; no urinary urgency; no dysuria; no pain; no abnormal vaginal discharge; no abnormal vaginal bleeding  Breast   no breast discharge; no breast changes; no breast pain  Musculoskeletal    no myalgias; no arthralgias; no back pain  Psychiatric           no depressed mood; no  anxiety    Hematologic           no tender lymph nodes; no noticeable swellings or lumps   Endocrine    no hot flashes; no heat/cold intolerance         Neurological   no tremor; no numbness and tingling; no headaches; no difficulty  sleeping    Gynecology History:  Menopause May 2000. No history of HRT.     Past Medical History:  Past Medical History:   Diagnosis Date     Agoraphobia with panic disorder      Asthma      Bipolar disorder (H)      Endometrial cancer (H)      Glaucoma      History of Bell's palsy      Hyperlipidemia      Hypertension      Hypothyroid      Meniere's disease      Peripheral polyneuropathy      SVT (supraventricular tachycardia) (H)      Type 2 diabetes mellitus (H)      Past Surgical History:  Past Surgical History:   Procedure Laterality Date     ADENOIDECTOMY       CATARACT IOL, RT/LT       HYSTERECTOMY RADICAL, SALPINGO-OOPHORECTOMY       TONSILLECTOMY       Health Maintenance:  Health Maintenance Due   Topic Date Due     TETANUS IMMUNIZATION (SYSTEM ASSIGNED)  05/02/1968     HEPATITIS C SCREENING  05/02/1968     LIPID SCREEN Q5 YR FEMALE (SYSTEM ASSIGNED)  05/02/1995     MAMMO SCREEN Q2 YR (SYSTEM ASSIGNED)  05/02/2000     COLON CANCER SCREEN (SYSTEM ASSIGNED)  05/02/2000     ADVANCE DIRECTIVE PLANNING Q5 YRS  05/02/2005     FALL RISK ASSESSMENT  05/02/2015     DEXA SCAN SCREENING (SYSTEM ASSIGNED)  05/02/2015     PNEUMOCOCCAL (1 of 2 - PCV13) 05/02/2015     INFLUENZA VACCINE (SYSTEM ASSIGNED)  09/01/2017     Last Pap Smear: January 2011             Result: normal  She has not had a history of abnormal Pap smears.    Last Mammogram: 1996              Result: normal      She has not had a history of abnormal mammograms.    Last Colonoscopy: See has never had a colonoscopy    Current Medications:     has a current medication list which includes the following prescription(s): aspirin, losartan potassium, hydrochlorothiazide, lamotrigine, insulin glargine, lorazepam, metformin,  "metoprolol tartrate, paroxetine hcl, and multiple vitamin.     Allergies: Sulfa and lisinopril    Social History:     Social History   Substance Use Topics     Smoking status: Never Smoker     Smokeless tobacco: Never Used     Alcohol use No     Family History:     Family History   Problem Relation Age of Onset     Breast Cancer Mother        Physical Exam:     /78  Pulse 70  Temp 98.1  F (36.7  C) (Oral)  Resp 18  Ht 1.727 m (5' 8\")  Wt 110.5 kg (243 lb 8 oz)  SpO2 95%  Breastfeeding? No  BMI 37.02 kg/m2  Body mass index is 37.02 kg/(m^2).    General Appearance: healthy and alert, no distress       Cardiovascular: regular rate and rhythm, no murmurs     Respiratory: lungs clear, no rales, rhonchi or wheezes, normal diaphragmatic excursion    Musculoskeletal: extremities non tender and without edema    Skin: no lesions or rashes     Neurological: normal gait, no gross defects     Psychiatric: appropriate mood and affect                               Hematological: normal cervical, supraclavicular and inguinal lymph nodes     Gastrointestinal:       abdomen soft, non-tender, non-distended, no organomegaly or masses    Genitourinary: deferred      Assessment:    Ginger Diaz is a 67 year old woman with vaginal cuff recurrence of initially stage 1B, grade 1 endometrial cancer.     A total of 50 minutes was spent with the patient, 40 minutes of which were spent in counseling the patient and/or treatment planning.      Plan:     1.)   Recurrent endometrial cancer:  Patient s/p pelvic radiation, scheduled for interstitial needle placement.  Patient's original MRI showing likely multivisceral involvment of recurrent disease with erosion of mass into wall of bladder and sigmoid.  Advised patient that there is a strongly likelihood that sigmoid colon will not be able to be dissected free of vaginal apex mass.  Discussed diagnostic laparoscopy, lysis of adhesions, possible laparotomy with bowel resection.  " T Risks, benefits and alternatives to proceed discussed in detail with the patient. Risks include but are not limited to bleeding, infection, possible injury to surrounding organs including bowel, bladder, ureter, need for second procedure/surgery related to complications from first procedure, postoperative medical complications such as cardiopulmonary events, lymphedema, lymphocyst, thromboembolic events. Also discussed specific risks related to the procedure including bowel perforation, conversion to laparotomy, fistula formation. Consent for surgery, blood transfusion signed.      Questions answered.      2.) Genetic risk factors were assessed and the patient does not meet the qualifications for a referral.      3.) Labs and/or tests ordered include: Preop labs     4.) Health maintenance issues addressed today include need for ongoing treatment and follow-up.      Josefa Bae MD  Gynecologic Oncology  [facility]    CC  Patient Care Team:  Casey Baez as PCP - General (Family Practice)  Greta Seay MD as MD (Oncology)  Cristian Bullock MD as MD (Radiation Oncology)  MARIA DE JESUS VAUGHN

## 2017-12-05 NOTE — LETTER
2017       RE: Ginger Diaz  [address]  [facility] [address]     Dear Colleague,    Thank you for referring your patient, Ginger Diaz, to the [facility]. Please see a copy of my visit note below.                            Consult Notes on Referred Patient    Date: 2017       Dr. Ema Valera MD  [address]  [place] [address]       RE: Ginger Diaz  : 1950  DEBBIE: 2017    Dear Dr. Ema Valera:    I had the pleasure of seeing your patient Ginger Diaz here at the Gynecologic Cancer Clinic at the [facility] on 2017.  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 Final pathology demonstrated grade 1 endometrial cancer, 4.5 cm in size, with 71% myometrial invasion, and some lymphovascular space invasion. Ovary, fallopian tubes and 20 lymph nodes and washings were negative. She was recommended to undergo adjuvant radiation therapy with vaginal brachytherapy; however, she declined.     She was seen on 2017 at [facility] by Dr. Greta Seay for vaginal spotting and abdominal cramping. On exam she was noted to have polypoid lesion at vaginal cuff which was biopsied and returned consistent with recurrent endometrial cancer. She had extensive discussion with Dr. Seay on treatment options including surgical resection followed by adjuvant therapy. She was not interested in surgery at the time and desired to discuss radiation therapy with radiation oncology.    Given these findings she was subsequently sent to the Gynecologic Cancer Clinic for new patient consultation.     Cancer History  2011: Underwent robotic-assisted total laparoscopic hysterectomy, bilateral salpingo-oopherectomy, and  staging. Pathology revealed grade 1 endometrioid adenocarcinoma of the endometrium, measuring up to 4.5 cm in size, with 1.5 of 2.1 cm myometrial invasion, and with focal lymphovascular space invasion. 0 of 20 pelvic lymph nodes were involved with carcinoma. Margins were negative by at least 2 cm, and peritoneal washings were negative for malignancy.    Recommended to undergo adjuvant therapy with vaginal cuff brachytherapy but patient declined.    9/13/2017: vaginal spotting and abdominal pain. Polypoid lesion on vaginal cuff. Pathology consistent with grade 1 recurrent endometrial cancer.     CT CAP: 5.5 x 4 x 6 cm lobulated soft tissue mass at vaginal cuff, posterior to bladder but concerning for possible invasion of bladder. Intimately associated with sigmoid colon.      9/20/2017: PET CT     10/3/2017: MRI -   1. Lobulated mixed, signal intensity, enhancing mass at the vaginal cuff which extends into the upper third of the vagina demonstrating heterogeneous enhancement with scattered areas of hypoenhancement, likely representing necrosis, measuring 4.2 x 5.5 x 4.2 cm.   2. The mass abuts the posterior-superior wall of the urinary bladder as well as a short segment of the adjacent sigmoid colon where there is evidence tethering. Intervening fat planes have been lost and findings are concerning for invasion.  3. No pelvic lymphadenopathy.  4. Colonic diverticulosis.    Review of Systems:    Systemic           no weight changes; no fever; no chills; no night sweats; no appetite changes  Skin           no rashes, or lesions  Eye           no irritation; no changes in vision  Kary-Laryngeal           no dysphagia; no hoarseness   Pulmonary    no cough; no shortness of breath  Cardiovascular    no chest pain; no palpitations  Gastrointestinal    no diarrhea; no constipation; no abdominal pain; no changes in bowel  habits; no blood in stool  Genitourinary   no urinary frequency; no urinary urgency; no dysuria; no pain;  no abnormal vaginal discharge; no abnormal vaginal bleeding  Breast   no breast discharge; no breast changes; no breast pain  Musculoskeletal    no myalgias; no arthralgias; no back pain  Psychiatric           no depressed mood; no anxiety    Hematologic           no tender lymph nodes; no noticeable swellings or lumps   Endocrine    no hot flashes; no heat/cold intolerance         Neurological   no tremor; no numbness and tingling; no headaches; no difficulty  sleeping    Gynecology History:  Menopause May 2000. No history of HRT.     Past Medical History:  Past Medical History:   Diagnosis Date     Agoraphobia with panic disorder      Asthma      Bipolar disorder (H)      Endometrial cancer (H)      Glaucoma      History of Bell's palsy      Hyperlipidemia      Hypertension      Hypothyroid      Meniere's disease      Peripheral polyneuropathy      SVT (supraventricular tachycardia) (H)      Type 2 diabetes mellitus (H)      Past Surgical History:  Past Surgical History:   Procedure Laterality Date     ADENOIDECTOMY       CATARACT IOL, RT/LT       HYSTERECTOMY RADICAL, SALPINGO-OOPHORECTOMY       TONSILLECTOMY       Health Maintenance:  Health Maintenance Due   Topic Date Due     TETANUS IMMUNIZATION (SYSTEM ASSIGNED)  05/02/1968     HEPATITIS C SCREENING  05/02/1968     LIPID SCREEN Q5 YR FEMALE (SYSTEM ASSIGNED)  05/02/1995     MAMMO SCREEN Q2 YR (SYSTEM ASSIGNED)  05/02/2000     COLON CANCER SCREEN (SYSTEM ASSIGNED)  05/02/2000     ADVANCE DIRECTIVE PLANNING Q5 YRS  05/02/2005     FALL RISK ASSESSMENT  05/02/2015     DEXA SCAN SCREENING (SYSTEM ASSIGNED)  05/02/2015     PNEUMOCOCCAL (1 of 2 - PCV13) 05/02/2015     INFLUENZA VACCINE (SYSTEM ASSIGNED)  09/01/2017     Last Pap Smear: January 2011             Result: normal  She has not had a history of abnormal Pap smears.    Last Mammogram: 1996              Result: normal      She has not had a history of abnormal mammograms.    Last Colonoscopy: See has never  "had a colonoscopy    Current Medications:     has a current medication list which includes the following prescription(s): aspirin, losartan potassium, hydrochlorothiazide, lamotrigine, insulin glargine, lorazepam, metformin, metoprolol tartrate, paroxetine hcl, and multiple vitamin.     Allergies: Sulfa and lisinopril    Social History:     Social History   Substance Use Topics     Smoking status: Never Smoker     Smokeless tobacco: Never Used     Alcohol use No     Family History:     Family History   Problem Relation Age of Onset     Breast Cancer Mother        Physical Exam:     /78  Pulse 70  Temp 98.1  F (36.7  C) (Oral)  Resp 18  Ht 1.727 m (5' 8\")  Wt 110.5 kg (243 lb 8 oz)  SpO2 95%  Breastfeeding? No  BMI 37.02 kg/m2  Body mass index is 37.02 kg/(m^2).    General Appearance: healthy and alert, no distress       Cardiovascular: regular rate and rhythm, no murmurs     Respiratory: lungs clear, no rales, rhonchi or wheezes, normal diaphragmatic excursion    Musculoskeletal: extremities non tender and without edema    Skin: no lesions or rashes     Neurological: normal gait, no gross defects     Psychiatric: appropriate mood and affect                               Hematological: normal cervical, supraclavicular and inguinal lymph nodes     Gastrointestinal:       abdomen soft, non-tender, non-distended, no organomegaly or masses    Genitourinary: deferred      Assessment:    Ginger Diaz is a 67 year old woman with vaginal cuff recurrence of initially stage 1B, grade 1 endometrial cancer.     A total of 50 minutes was spent with the patient, 40 minutes of which were spent in counseling the patient and/or treatment planning.      Plan:     1.)   Recurrent endometrial cancer:  Patient s/p pelvic radiation, scheduled for interstitial needle placement.  Patient's original MRI showing likely multivisceral involvment of recurrent disease with erosion of mass into wall of bladder and sigmoid.  " Advised patient that there is a strongly likelihood that sigmoid colon will not be able to be dissected free of vaginal apex mass.  Discussed diagnostic laparoscopy, lysis of adhesions, possible laparotomy with bowel resection.  T Risks, benefits and alternatives to proceed discussed in detail with the patient. Risks include but are not limited to bleeding, infection, possible injury to surrounding organs including bowel, bladder, ureter, need for second procedure/surgery related to complications from first procedure, postoperative medical complications such as cardiopulmonary events, lymphedema, lymphocyst, thromboembolic events. Also discussed specific risks related to the procedure including bowel perforation, conversion to laparotomy, fistula formation. Consent for surgery, blood transfusion signed.      Questions answered.      2.) Genetic risk factors were assessed and the patient does not meet the qualifications for a referral.      3.) Labs and/or tests ordered include: Preop labs     4.) Health maintenance issues addressed today include need for ongoing treatment and follow-up.      Josefa Bae MD  Gynecologic Oncology  [facility]    CC  Patient Care Team:  Casey Baez as PCP - General (Family Practice)  Greta Seay MD as MD (Oncology)  Cristian Bullock MD as MD (Radiation Oncology)  MARIA DE JESUS VAUGHN

## 2017-12-05 NOTE — NURSING NOTE
"Oncology Rooming Note    December 5, 2017 2:55 PM   Ginger Diaz is a 67 year old female who presents for:    Chief Complaint   Patient presents with     Oncology Clinic Visit     new patient visit for consultation/visit before laporoscopic interstital needle placement      Initial Vitals: /78  Pulse 70  Temp 98.1  F (36.7  C) (Oral)  Resp 18  Ht 1.727 m (5' 8\")  Wt 110.5 kg (243 lb 8 oz)  SpO2 95%  Breastfeeding? No  BMI 37.02 kg/m2 Estimated body mass index is 37.02 kg/(m^2) as calculated from the following:    Height as of this encounter: 1.727 m (5' 8\").    Weight as of this encounter: 110.5 kg (243 lb 8 oz). Body surface area is 2.3 meters squared.  No Pain (0) Comment: Data Unavailable   No LMP recorded. Patient is postmenopausal.  Allergies reviewed: Yes  Medications reviewed: Yes    Medications: Medication refills not needed today.  Pharmacy name entered into EPIC: [facility] - [place], [place] - [address]    Clinical concerns: no concerns dr. blake was notified.    7 minutes for nursing intake (face to face time)     Panfilo Levin CMA              "

## 2017-12-05 NOTE — MR AVS SNAPSHOT
"              After Visit Summary   2017    Ginger Diaz    MRN: [id number]           Patient Information     Date Of Birth          1950        Visit Information        Provider Department      2017 3:00 PM Josefa Bae MD [facility]        Today's Diagnoses     Recurrent carcinoma of endometrium (H)    -  1       Follow-ups after your visit        Who to contact     If you have questions or need follow up information about today's clinic visit or your schedule please contact [facility] directly at [phone].  Normal or non-critical lab and imaging results will be communicated to you by Viedeahart, letter or phone within 4 business days after the clinic has received the results. If you do not hear from us within 7 days, please contact the clinic through Viedeahart or phone. If you have a critical or abnormal lab result, we will notify you by phone as soon as possible.  Submit refill requests through Signadyne or call your pharmacy and they will forward the refill request to us. Please allow 3 business days for your refill to be completed.          Additional Information About Your Visit        MyChart Information     Signadyne lets you send messages to your doctor, view your test results, renew your prescriptions, schedule appointments and more. To sign up, go to [email or web address] . Click on \"Log in\" on the left side of the screen, which will take you to the Welcome page. Then click on \"Sign up Now\" on the right side of the page.     You will be asked to enter the access code listed below, as well as some personal information. Please follow the directions to create your username and password.     Your access code is: [email or web address]  Expires: 2018  9:36 AM     Your access code will  in 90 days. If you need help or a new code, please call your [place] clinic or [phone].        Care EveryWhere ID     This is your Care EveryWhere ID. This could " "be used by other organizations to access your [facility] medical records  [id number]        Your Vitals Were     Pulse Temperature Respirations Height Pulse Oximetry Breastfeeding?    70 98.1  F (36.7  C) (Oral) 18 1.727 m (5' 8\") 95% No    BMI (Body Mass Index)                   37.02 kg/m2            Blood Pressure from Last 3 Encounters:   12/14/17 169/53   12/06/17 189/64   12/05/17 143/78    Weight from Last 3 Encounters:   12/12/17 109 kg (240 lb 4.8 oz)   12/06/17 110.5 kg (243 lb 9.6 oz)   12/05/17 110.5 kg (243 lb 8 oz)              Today, you had the following     No orders found for display       Primary Care Provider Office Phone # Fax #    Casey Baez [phone]       [facility] [address] [place] [address]        Equal Access to Services     [facility]: Hadii tricia ku hadasho Soomaali, waaxda luqadaha, qaybta kaalmada adeegyada, josé luis rosas . So [facility] [phone].    ATENCIÓN: Si kamilahla yumiko, tiene a jenkins disposición servicios gratuitos de asistencia lingüística. Llame al [phone].    We comply with applicable federal civil rights laws and [place] laws. We do not discriminate on the basis of race, color, national origin, age, disability, sex, sexual orientation, or gender identity.            Thank you!     Thank you for choosing [facility]  for your care. Our goal is always to provide you with excellent care. Hearing back from our patients is one way we can continue to improve our services. Please take a few minutes to complete the written survey that you may receive in the mail after your visit with us. Thank you!             Your Updated Medication List - Protect others around you: Learn how to safely use, store and throw away your medicines at www.disposemymeds.org.          This list is accurate as of: 12/5/17 11:59 PM.  Always use your most recent med list.                   Brand Name Dispense " Instructions for use Diagnosis    acetaminophen 325 MG tablet    TYLENOL    100 tablet    Take 2 tablets (650 mg) by mouth every 4 hours as needed for mild pain or fever    Recurrent carcinoma of endometrium (H)       ASPIRIN PO      Take 81 mg by mouth daily        ATIVAN PO      Take 0.5 mg by mouth as needed for anxiety        COZAAR PO      Take 50 mg by mouth every morning        hydrochlorothiazide 12.5 MG capsule    MICROZIDE     Take 25 mg by mouth daily        LAMICTAL PO      100 mg in the AM, 200 mg at HS        LANTUS SOLOSTAR SC      Inject 40 Units Subcutaneous At Bedtime        metFORMIN 500 MG 24 hr tablet    GLUCOPHAGE-XR     Take 500 mg by mouth daily (with dinner)        METOPROLOL TARTRATE PO      Take 25 mg by mouth 3 times daily        MULTIPLE VITAMIN PO           oxyCODONE IR 5 MG tablet    ROXICODONE    25 tablet    Take 1-2 tablets (5-10 mg) by mouth every 4 hours as needed for moderate to severe pain    Recurrent carcinoma of endometrium (H)       PAXIL PO      Take 40 mg by mouth daily        senna-docusate 8.6-50 MG per tablet    SENOKOT-S;PERICOLACE    60 tablet    Take 1-2 tablets by mouth 2 times daily as needed for constipation    Recurrent carcinoma of endometrium (H)

## 2017-12-06 ENCOUNTER — OFFICE VISIT (OUTPATIENT)
Dept: SURGERY | Facility: CLINIC | Age: 67
End: 2017-12-06

## 2017-12-06 ENCOUNTER — ANESTHESIA EVENT (OUTPATIENT)
Dept: SURGERY | Facility: CLINIC | Age: 67
DRG: 745 | End: 2017-12-06
Payer: COMMERCIAL

## 2017-12-06 ENCOUNTER — ALLIED HEALTH/NURSE VISIT (OUTPATIENT)
Dept: SURGERY | Facility: CLINIC | Age: 67
End: 2017-12-06

## 2017-12-06 ENCOUNTER — HOSPITAL ENCOUNTER (OUTPATIENT)
Dept: MRI IMAGING | Facility: CLINIC | Age: 67
Discharge: HOME | End: 2017-12-06
Attending: RADIOLOGY | Admitting: RADIOLOGY
Payer: COMMERCIAL

## 2017-12-06 VITALS
RESPIRATION RATE: 16 BRPM | HEIGHT: 68 IN | HEART RATE: 82 BPM | DIASTOLIC BLOOD PRESSURE: 64 MMHG | BODY MASS INDEX: 36.92 KG/M2 | OXYGEN SATURATION: 94 % | TEMPERATURE: 98 F | WEIGHT: 243.6 LBS | SYSTOLIC BLOOD PRESSURE: 189 MMHG

## 2017-12-06 DIAGNOSIS — Z01.818 PREOP EXAMINATION: Primary | ICD-10-CM

## 2017-12-06 DIAGNOSIS — C54.1 RECURRENT CARCINOMA OF ENDOMETRIUM (H): ICD-10-CM

## 2017-12-06 PROCEDURE — A9585 GADOBUTROL INJECTION: HCPCS | Performed by: RADIOLOGY

## 2017-12-06 PROCEDURE — 72197 MRI PELVIS W/O & W/DYE: CPT

## 2017-12-06 PROCEDURE — 25000128 H RX IP 250 OP 636: Performed by: RADIOLOGY

## 2017-12-06 PROCEDURE — 40000141 ZZH STATISTIC PERIPHERAL IV START W/O US GUIDANCE

## 2017-12-06 RX ORDER — GADOBUTROL 604.72 MG/ML
10 INJECTION INTRAVENOUS ONCE
Status: COMPLETED | OUTPATIENT
Start: 2017-12-06 | End: 2017-12-06

## 2017-12-06 RX ORDER — LOPERAMIDE HCL 2 MG
4 CAPSULE ORAL DAILY PRN
COMMUNITY

## 2017-12-06 RX ADMIN — GADOBUTROL 10 ML: 604.72 INJECTION INTRAVENOUS at 15:15

## 2017-12-06 ASSESSMENT — LIFESTYLE VARIABLES: TOBACCO_USE: 0

## 2017-12-06 NOTE — PROGRESS NOTES
Preoperative Assessment Center medication history for December 6, 2017 is complete.    See Epic admission navigator for allergy information, pharmacy and prior to admission medications.    Operating room staff will still need to confirm medications and last dose information on day of surgery.     Medication history interview sources:  Patient Interview and [facility] and Rx outreach    Changes made to PTA medication list (reason)  Added:   -- meclizine per patient  -- imodium per patient  -- Levothyroxin     Deleted:   -- MVI - patient no longer taking    Changed:   -- directions of metoprolol changed to TID per patient for her SVT  -- dose of lantus updated per patient     Additional medication history information (including reliability of information, actions taken by pharmacist):    -- Patient reports receiving a 7 day course of macrobid last week.  -- No recent (within 30 days) course of steroids  -- patient stopped the following medications 5 weeks ago for a couple of procedures and has also continued to stop taking them for the upcoming procedure. She plans to resume afterwards though. Aspirin, hydrochlorothiazide, lamotrigine, paroxetine, metformin  -- When I called RX outreach, they told me that they had a script for lamotrigine but was never filled. Metformin and crestor was last filled in May. Recently the Metoprolol and glipizide were filled in September for a 90 day supply.   -- I was not able to confirm the levothyroxin with the two pharmcy that she mentioned even though she reports taking it every day.     Prior to Admission medications    Medication Sig Last Dose Taking? Auth Provider   MECLIZINE HCL PO Take 25 mg by mouth daily  Taking Yes Unknown, Entered By History   loperamide (IMODIUM) 2 MG capsule Take 4 mg by mouth daily as needed for diarrhea Taking Yes Unknown, Entered By History   Losartan Potassium (COZAAR PO) Take 50 mg by mouth every morning  Taking Yes Reported, Patient   Insulin  Glargine (LANTUS SOLOSTAR SC) Inject 40 Units Subcutaneous At Bedtime  Taking Yes Reported, Patient   METOPROLOL TARTRATE PO Take 25 mg by mouth 3 times daily  Taking Yes Reported, Patient   GLIPIZIDE PO Take 10 mg by mouth 2 times daily (before meals) Not Taking  Unknown, Entered By History   LEVOTHYROXINE SODIUM PO Take 25 mcg by mouth daily Unknown  Unknown, Entered By History   ASPIRIN PO Take 81 mg by mouth daily  Not Taking  Reported, Patient   hydrochlorothiazide (MICROZIDE) 12.5 MG capsule Take 25 mg by mouth daily Not Taking  Reported, Patient   LamoTRIgine (LAMICTAL PO) 100 mg in the AM, 200 mg at HS  Not Taking  Reported, Patient   LORazepam (ATIVAN PO) Take 0.5 mg by mouth as needed for anxiety Not Taking  Reported, Patient   metFORMIN (GLUCOPHAGE-XR) 500 MG 24 hr tablet Take 500 mg by mouth daily (with dinner)  Not Taking  Reported, Patient   PARoxetine HCl (PAXIL PO) Take 40 mg by mouth daily Not Taking  Reported, Patient       Medication history completed by:   Dwaine Siddiqui, Pharm.D, BCPS

## 2017-12-06 NOTE — H&P
Pre-Operative H & P     CC:  Preoperative exam to assess for increased cardiopulmonary risk while undergoing surgery and anesthesia.    Date of Encounter: 12/6/2017  Primary Care Physician:  Casey Baez  Ginger Diaz is a 67 year old female who presents for pre-operative H & P in preparation for laparoscopic, US guided placement of interstitial needles on 12/12/17, followed by removal of interstitial needles on 12/14/17 by Ofelia Valera and Kathia at [facility]. History is obtained from the patient.     Patient with history of endometrioid adenocarcinoma of the endometrium. She is s/p robotic-assisted total laparoscopic hysterectomy, bilateral salpingo-oopherectomy, and staging on 6/12/2011. Final pathology demonstrated grade 1 endometrial cancer, 4.5 cm in size, with 71% myometrial invasion, and some lymphovascular space invasion.She was recommended to undergo adjuvant radiation therapy with vaginal brachytherapy; however, she declined at that time.     More recently, she was seen on 9/13/2017 at [facility] with concerns for vaginal spotting and abdominal cramping. On exam she was noted to have a polypoid lesion at vaginal cuff which was biopsied and returned  recurrent endometrial cancer. Treatment options were presented to her including surgical resection followed by adjuvant therapy. She was not interested in surgery and agreed to discuss radiation therapy with radiation oncology. She has since consulted with Ofelia Valera and Kathia with above procedure now planned.     Patient's history is otherwise significant for HLD, HYPERTENSION, SVT, asthma, DIABETES MELLITUS, and bipolar disorder.     Past Medical History  Past Medical History:   Diagnosis Date     Agoraphobia with panic disorder      Asthma      Bipolar disorder (H)      Endometrial cancer (H)      Glaucoma      History of Bell's palsy      Hyperlipidemia      Hypertension       Hypothyroid      Meniere's disease      Peripheral polyneuropathy      SVT (supraventricular tachycardia) (H)      Type 2 diabetes mellitus (H)        Past Surgical History  Past Surgical History:   Procedure Laterality Date     ADENOIDECTOMY       CATARACT IOL, RT/LT       HYSTERECTOMY RADICAL, SALPINGO-OOPHORECTOMY       TONSILLECTOMY         Hx of Blood transfusions/reactions: Denies.      Hx of abnormal bleeding or anti-platelet use: ASA prescribed but not taking.    Menstrual history: No LMP recorded. Patient is postmenopausal. Hysterectomy in 2011.    Steroid use in the last year: Denies.     Personal or FH with difficulty with Anesthesia:  PONV. Slow to wake.    Prior to Admission Medications  Current Outpatient Prescriptions   Medication Sig Dispense Refill     MECLIZINE HCL PO Take 25 mg by mouth daily        loperamide (IMODIUM) 2 MG capsule Take 4 mg by mouth daily as needed for diarrhea       GLIPIZIDE PO Take 10 mg by mouth 2 times daily (before meals)       LEVOTHYROXINE SODIUM PO Take 25 mcg by mouth daily       ASPIRIN PO Take 81 mg by mouth daily        Losartan Potassium (COZAAR PO) Take 50 mg by mouth every morning        hydrochlorothiazide (MICROZIDE) 12.5 MG capsule Take 25 mg by mouth daily       LamoTRIgine (LAMICTAL PO) 100 mg in the AM, 200 mg at HS        Insulin Glargine (LANTUS SOLOSTAR SC) Inject 40 Units Subcutaneous At Bedtime        LORazepam (ATIVAN PO) Take 0.5 mg by mouth as needed for anxiety       metFORMIN (GLUCOPHAGE-XR) 500 MG 24 hr tablet Take 500 mg by mouth daily (with dinner)        METOPROLOL TARTRATE PO Take 25 mg by mouth 3 times daily        PARoxetine HCl (PAXIL PO) Take 40 mg by mouth daily         Allergies  Allergies   Allergen Reactions     Lisinopril Cough     Sulfa Drugs Rash       Social History  Social History     Social History     Marital status:      Spouse name: N/A     Number of children: N/A     Years of education: N/A     Occupational History  "    Not on file.     Social History Main Topics     Smoking status: Never Smoker     Smokeless tobacco: Never Used     Alcohol use No     Drug use: No     Sexual activity: Not on file     Other Topics Concern     Not on file     Social History Narrative       Family History  Family History   Problem Relation Age of Onset     Breast Cancer Mother        Review of Systems    The complete review of systems is negative other than noted in the HPI or here.   Constitutional: Denies fever, chills, weight loss. Feels cold sometimes.  Skin: Some skin breakdown under pannus since radiation. Is going to speak to Dr. Valera about this later today.  HEENT: Wears glasses for vision. Denies swallowing difficulty. Does not wear partial. Deafness in right ear. Menieres with frequent dizziness. Takes Meclizine.   Respiratory: Denies cough or shortness of breath. History of asthma but no issues in years. Denies concern for OFELIA. History of neg sleep study.  CV: Denies chest pain or irregular HR. Does not do much exercise. Would be able to walk a block before stopping. Past history of SVT, followed by outside Cards. Recent evaluation with event monitor with concern for wide complex tachycardia. Recommendation to reestablish care with EP doctor.  GI: Low abdominal pain. Frequent diarrhea. Using some Imodium. Denies GERD.  : Denies dysuria. Some urgency and incontinence.   M/S: Arthritis in hands.  Neuro: History of Bell's palsy, right side in 2016. Believes that some numbness of her head and right face and tongue has returned since radiation.    Temp: 98  F (36.7  C) Temp src: Oral BP: 189/64 Pulse: 82   Resp: 16 SpO2: 94 %         243 lbs 9.6 oz  5' 8\"   Body mass index is 37.04 kg/(m^2).       Physical Exam  Constitutional: Awake, alert, cooperative, no apparent distress, and appears stated age. Accompanied by daughter.   Eyes: Pupils equal, round and reactive to light, extra ocular muscles intact, sclera clear, conjunctiva normal. " Glasses on.  HENT: Normocephalic, oral pharynx with moist mucus membranes, some teeth missing. No goiter appreciated. Has follow up planned regarding her thyroid after something noted on imaging.  Respiratory: Clear to auscultation bilaterally, no crackles or wheezing. No cough or obvious dyspnea.  Cardiovascular: Regular rate and rhythm, normal S1 and S2, and no murmur noted. Carotids, no bruits. No edema. Palpable pulses to radial  DP and PT arteries.   GI: Normal bowel sounds, soft, non-distended, non-tender, no masses palpated. Difficult exam due to obese abdomen.  Lymph/Hematologic: No cervical lymphadenopathy and no supraclavicular lymphadenopathy.  Genitourinary: Deferred.  Skin: Warm and dry.    Musculoskeletal: Full ROM of neck. There is no redness, warmth, or swelling of the joints. Gross motor strength is normal.    Neurologic: Awake, alert, oriented to name, place and time. Cranial nerves II-XII are grossly intact. Gait is normal. Smile is symmetrical. No facial drooping.  Neuropsychiatric: Anxious, talkative, cooperative. Normal affect.     Labs: (personally reviewed) OSH 11/15/17  WBC 6.2  Hgb 13  Hct 38.7  Platelets 139  10/30/17  Na 138  K 4.4  Cl 101  Glu 214  Cr 0.74  GFR >60  A1C 7.8  EKG: OSH 10/10/17 Sinus bradycardia  Cardiac echo: 7/5/17  Final Conclusion   The ejection fraction is visually estimated at 60-65%.   Mild left ventricular hypertrophy.   Mild left atrial enlargement.   No significant valvular heart disease noted.   Estimated EF: 60-65%    Cardiac event monitor 6/16/17  There was a total of 322 hours recorded;  79% of that was readable data.  Average heart rate during this monitoring was 74 beats per minute. There were no pauses greater than 2 seconds.  Bradycardia compromised 6%, while tachycardia compromised 3% of readable data. There were 2 manually triggered recordings.  One of the manually triggered recordings with complaints of lightheaded and dizziness as well as fluttering  in the chest is consistent with initiation of a regular wide complex tachycardia at 120 beats per minute.  This shows initially sinus rhythm with PVC followed by a pause and then a regular wide complex tachycardia given the patient's history of supraventricular tachycardia in the past as well as a bit of an initial warm up with the first few complexes looking different than the following complexes and the tachycardia having a short RP configuration.  I would favor supraventricular tachycardia with aberancy as the mechanism.  Only the initiation is seen on available  tracings.      Outside records reviewed from: Care Everywhere    ASSESSMENT and PLAN  Ginger Diaz is a 67 year old female scheduled to undergo laparoscopic, US guided placement of interstitial needles on 12/12/17, followed by removal of interstitial needles on 12/14/17 by Ofelia Valera and Kathia. She has the following specific operative considerations:   - RCRI : No serious cardiac risks.     - Anesthesia considerations:  Refer to PAC assessment in anesthesia records  - VTE risk: 3%  - OFELIA # of risks 3/8 = Intermediate risk   - Risk of PONV score = 3.  If > 2, anti-emetic intervention recommended. If 3 or > anti emetic intervention recommended with two or more meds     --Recurrent endometrial cancer. S/p 5 weeks external beam radiation. Above procedure now planned.    --PONV. Significant history. Final decisions regarding prophylaxis by Anesthesia on DOS.   --HLD, HYPERTENSION. Will hold Losartan on DOS. History of SVT with no intervention. On Metoprolol THREE TIMES DAILY and will take on DOS. Last episode 2-3 years ago. More recent return to Cardiology for possible syncope. Cardiac event monitor showing episode of wide complex tachycardia which was symptomatic. Echocardiogram above. Recommended that she reestablish with EP physician for further recommendations. Testing above. Doesn't do much exercise. Currently able to walk one block without need  for stopping.    --Nonsmoker. Remote history of asthma with no recent symptoms or use of inhaler. History of neg sleep study.    --Hypothyroidism. Will take Synthroid on DOS.   --Pain management. Discussed possibility of nerve block if appropriate for patient's procedure. Epidural recommended. Final counseling and decisions by regional team on DOS.   --DIABETES MELLITUS II. Last A1C 7.8. Will take 40 units of Lantus insulin on evening before surgery. Neuropathy feet.   --History of Bell's palsy right face in 2016. Resolved, although has had some return of numbness after recent radiation therapy.   --Bipolar disorder. Occasional Ativan for anxiety.    --Patient has several medications prescribed that she is not taking, listed on med list.     Arrival time, NPO, shower and medication instructions provided by nursing staff today. Preparing For Your Surgery handout given.    Patient was discussed with Dr De Leon.    ANITA Parish CNS  Preoperative Assessment Center  [facility]  Phone: [phone]  Fax: [phone]

## 2017-12-06 NOTE — MR AVS SNAPSHOT
After Visit Summary   2017    Ginger Diaz    MRN: [id number]           Patient Information     Date Of Birth          1950        Visit Information        Provider Department      2017 12:00 PM Rn, [facility] Pre[facility]        Care Instructions    Preparing for Your Surgery      Name:  Ginger Diaz   MRN:  [id number]   :  1950   Today's Date:  2017     Arriving for surgery:  Surgery date:  2017  Arrival time:  6:00am  Please come to:       [facility] [address]  [address] [place]  [address]    -   parking is available in front of the hospital from 5:15 am to 8:00 pm    -  Stop at the Information Desk in the lobby    -   Inform the information person that you are here for surgery. An escort to 3c will be provided. If you would not like an escort, please proceed to 3C on the 3rd floor. [phone]     What can I eat or drink?  -  You may have solid food or milk products until 8 hours prior to your surgery.  -  You may have water, apple juice or 7up/Sprite until 2 hours prior to your surgery.    Which medicines can I take?  -  Do NOT take these medications in the morning, the day of surgery:  Losartan, vitamins       Continue to hold aspirin as directed    -  Please take these medications the day of surgery:  Take 80% of Lantus (32units), metoprolol,synthroid, meclizine and lorazepam as needed    How do I prepare myself?  -  Take two showers: one the night before surgery; and one the morning of surgery.         Use Scrubcare or Hibiclens to wash from neck down.  You may use your own shampoo and conditioner. No other hair products.   -  Do NOT use lotion, powder, deodorant, or antiperspirant the day of your surgery.  -  Do NOT wear any makeup, fingernail polish or jewelry.  -  Begin using Incentive Spirometer 1 week prior to surgery.  Use 4 times per day, up to 5 breaths each time.  Bring  Incentive Spirometer to hospital.  -Do not bring your own medications to the hospital, except for inhalers and eye drops.  -  Bring your ID and insurance card.    Questions or Concerns:  If you have questions or concerns, please call the  Preoperative Assessment Center, Monday-Friday 7AM-7PM:  [phone]    AFTER YOUR SURGERY  Breathing exercises   Breathing exercises help you recover faster. Take deep breaths and let the air out slowly. This will:     Help you wake up after surgery.    Help prevent complications like pneumonia.  Preventing complications will help you go home sooner.   We may give you a breathing device (incentive spirometer) to encourage you to breathe deeply.   Nausea and vomiting   You may feel sick to your stomach after surgery; if so, let your nurse know.    Pain control:  After surgery, you may have pain. Our goal is to help you manage your pain. Pain medicine will help you feel comfortable enough to do activities that will help you heal.  These activities may include breathing exercises, walking and physical therapy.   To help your health care team treat your pain we will ask: 1) If you have pain  2) where it is located 3) describe your pain in your words  Methods of pain control include medications given by mouth, vein or by nerve block for some surgeries.  We may give you a pain control pump that will:  1) Deliver the medicine through a tube placed in your vein  2) Control the amount of medicine you receive  3) Allow you to push a button to deliver a dose of pain medicine  Sequential Compression Device (SCD) or Pneumo Boots:  You may need to wear SCD S on your legs or feet. These are wraps connected to a machine that pumps in air and releases it. The repeated pumping helps prevent blood clots from forming.     Using an Incentive Spirometer    An incentive spirometer is a device that helps you do deep breathing exercises. These exercises expand your lungs, aid in circulation, and help  prevent pneumonia. Deep breathing exercises also help you breathe better and improve the function of your lungs by:    Keeping your lungs clear    Strengthening your breathing muscles    Helping prevent respiratory complications or problems  The incentive spirometer gives you a way to take an active part in recover. A nurse or therapist will teach you breathing exercises. To do these exercises, you will breathe in through your mouth and not your nose. The incentive spirometer only works correctly if you breathe in through your mouth.  Steps to clear lungs  Step 1. Exhale normally. Then, inhale normally.    Relax and breathe out.  Step 2. Place your lips tightly around the mouthpiece.    Make sure the device is upright and not tilted.  Step 3. Inhale as much air as you can through the mouthpiece (don't breath through your nose).    Inhale slowly and deeply.    Hold your breath long enough to keep the balls or disk raised for at least 3 to 5 seconds, or as instructed by your healthcare provider.    Some spirometers have an indicator to let you know that you are breathing in too fast. If the indicator goes off, breathe in more slowly.  Step 4. Repeat the exercise regularly.    Do this exercise every hour while you're awake, or as instructed by your healthcare provider.    If you were taught deep breathing and coughing exercises, do them regularly as instructed by your healthcare provider.   Follow-up care  Make a follow up appointment, or as directed. Also, follow up with your healthcare provider as advised or if your symptoms do not improve or continue to get worse.  When to call your healthcare provider  Call your healthcare provider right away if you have any of the following:    Fever 100.4  (38 C) or higher, or as directed by your healthcare provider    Brownish, bloody, or smelly sputum  Call 911  Call 911 if any of these occur:     Shortness of breath that doesn't get better after taking your medicine    Cool,  moist, pale or blue skin    Trouble breathing or swallowing, wheezing    Fainting or loss of consciousness    Feeling of fizziness or weakness or a sudden drop in blood pressure    Feeling of doom or lightheaded    Chest pain or rapid heart rate  Date Last Reviewed: 1/1/2017 2000-2017 [employer]. [address] [place] [address]. All rights reserved. This information is not intended as a substitute for professional medical care. Always follow your healthcare professional's instructions.                    Follow-ups after your visit        Your next 10 appointments already scheduled     Dec 06, 2017 12:40 PM CST   (Arrive by 12:25 PM)   PAC Anesthesia Consult with  Pac Anesthesiologist   [facility]operative [facility] ([facility])    [address] [place] [address]  4th Floor  [facility] [id number] [phone]            Dec 06, 2017  1:00 PM CST   LAB with  LAB   [facility] ([facility])    [address] [place] [address]  1st [facility] [id number] [phone]           Please do not eat 10-12 hours before your appointment if you are coming in fasting for labs on lipids, cholesterol, or glucose (sugar). This does not apply to pregnant women. Water, hot tea and black coffee (with nothing added) are okay. Do not drink other fluids, diet soda or chew gum.            Dec 06, 2017  2:00 PM CST   MR PELVIS W/O & W CONTRAST with UUMR2   [facility] [place], [facility] ([facility] [place] [facility])    [address] [facility] [address] [phone]           Take your medicines as usual, unless your doctor tells you not to. Bring a list of your current medicines to your exam (including vitamins, minerals and over-the-counter drugs).  You will be given intravenous contrast for this exam. To prepare:   The day before your exam, drink extra fluids at least six 8-ounce glasses (unless your doctor  tells you to restrict your fluids).   Have a blood test (creatinine test) within 30 days of your exam. Go to your clinic or Diagnostic Imaging Department for this test.  The MRI machine uses a strong magnet. Please wear clothes without metal (snaps, zippers). A sweatsuit works well, or we may give you a hospital gown.  Please remove any body piercings and hair extensions before you arrive. You will also remove watches, jewelry, hairpins, wallets, dentures, partial dental plates and hearing aids. You may wear contact lenses, and you may be able to wear your rings. We have a safe place to keep your personal items, but it is safer to leave them at home.   **IMPORTANT** THE INSTRUCTIONS BELOW ARE ONLY FOR THOSE PATIENTS WHO HAVE BEEN TOLD THEY WILL RECEIVE SEDATION OR GENERAL ANESTHESIA DURING THEIR MRI PROCEDURE:  IF YOU WILL RECEIVE SEDATION (take medicine to help you relax during your exam):   You must get the medicine from your doctor before you arrive. Bring the medicine to the exam. Do not take it at home.   Arrive one hour early. Bring someone who can take you home after the test. Your medicine will make you sleepy. After the exam, you may not drive, take a bus or take a taxi by yourself.   No eating 8 hours before your exam. You may have clear liquids up until 4 hours before your exam. (Clear liquids include water, clear tea, black coffee and fruit juice without pulp.)  IF YOU WILL RECEIVE ANESTHESIA (be asleep for your exam):   Arrive 1 1/2 hours early. Bring someone who can take you home after the test. You may not drive, take a bus or take a taxi by yourself.   No eating 8 hours before your exam. You may have clear liquids up until 4 hours before your exam. (Clear liquids include water, clear tea, black coffee and fruit juice without pulp.)  Please call the Imaging Department at your exam site with any questions.            Dec 12, 2017  7:55 AM CST   (Arrive by 6:00 AM)   TCT/SIM Suite Visit with Ema LAGOS  MD [place]   Radiation Oncology Clinic ([facility])    [facility]  1st Floor  [address] [facility] [address]   [phone]            Dec 12, 2017   Procedure with Ema Valera MD   [facility] [place], Same Day Surgery (--)    [address] [facility] [address]   [phone]            Dec 12, 2017 11:30 AM CST   TCT/SIM Suite Visit with Ema Valera MD   Radiation Oncology Clinic ([facility])    [facility]  1st Floor  [address] [facility] [address]   [phone]            Dec 12, 2017  3:00 PM CST   TCT/SIM Suite Visit with Ema Valera MD   Radiation Oncology Clinic ([facility])    [facility]  1st Floor  [address] [facility] [address]   [phone]            Dec 13, 2017  9:00 AM CST   TCT/SIM Suite Visit with Ema Valera MD   Radiation Oncology Clinic ([facility])    [facility]  1st Floor  [address] [facility] [address]   [phone]            Dec 13, 2017  3:00 PM CST   TCT/SIM Suite Visit with Ema Valera MD   Radiation Oncology Clinic ([facility])    [facility]  1st Floor  [address] [facility] [address]   [phone]            Dec 14, 2017  8:00 AM CST   TCT/SIM Suite Visit with Adryan Zambrano MD   Radiation Oncology Clinic ([facility])    [facility]  1st Floor  [address] [facility] [address]   [phone]              Who to contact     Please call your clinic at [phone] to:    Ask questions about your health    Make or cancel appointments    Discuss your medicines    Learn about your test results    Speak to your doctor   If you have compliments or concerns about an experience at your clinic, or if you wish to file a complaint, please contact [facility] Patient Relations at [phone] or email us at [email or web address]         Additional Information About Your Visit        Tred Information     Tred is an electronic gateway that provides easy, online access to your medical records. With Tred, you can request a clinic appointment, read your test results, renew a prescription or communicate with your care team.     To sign up for Tred visit the website at [email or web address]   You will be asked to enter the access code listed below, as well as some personal information. Please follow the directions to create your username and password.     Your access code is: [email or web address]  Expires: 2018  9:36 AM     Your access code will  in 90 days. If you need help or a new code, please contact your [facility] or call [phone] for assistance.        Care EveryWhere ID     This is your Care EveryWhere ID. This could be used by other organizations to access your [place] medical records  [id number]         Blood Pressure from Last 3 Encounters:   17 189/64   17 143/78   10/03/17 182/71    Weight from Last 3 Encounters:   17 110.5 kg (243 lb 9.6 oz)   17 110.5 kg (243 lb 8 oz)   10/03/17 115.2 kg (254 lb)              Today, you had the following     No orders found for display         Today's Medication Changes          These changes are accurate as of: 17 12:20 PM.  If you have any questions, ask your nurse or doctor.               These medicines have changed or have updated prescriptions.        Dose/Directions    GLIPIZIDE PO   This may have changed:  Another medication with the same name was removed. Continue taking this medication, and follow the directions you see here.   Changed by:  Pharmacist, [facility]        Dose:  10 mg   Take 10 mg by mouth 2 times daily (before meals)   Refills:  0         Stop taking these medicines if you haven't already. Please  contact your care team if you have questions.     MULTIPLE VITAMIN PO   Stopped by:  Pharmacist, [facility]                    Primary Care Provider Office Phone # Fax #    Casey Baez [phone]       [facility] [address] [place] [address]        Equal Access to Services     ISABEL RODRIGUEZPRINCESS : Hadii aad ku hadasho Soomaali, waaxda luqadaha, qaybta kaalmada adeegyada, waxay idiin hayaan adeeg fili lacresencio dillon. So [facility] [phone].    ATENCIÓN: Si habla español, tiene a jenkins disposición servicios gratuitos de asistencia lingüística. [facility] [phone].    We comply with applicable federal civil rights laws and [place] laws. We do not discriminate on the basis of race, color, national origin, age, disability, sex, sexual orientation, or gender identity.            Thank you!     Thank you for choosing [facility] PREOPERATIVE ASSESSMENT [facility]ENTER  for your care. Our goal is always to provide you with excellent care. Hearing back from our patients is one way we can continue to improve our services. Please take a few minutes to complete the written survey that you may receive in the mail after your visit with us. Thank you!             Your Updated Medication List - Protect others around you: Learn how to safely use, store and throw away your medicines at www.disposemymeds.org.          This list is accurate as of: 12/6/17 12:20 PM.  Always use your most recent med list.                   Brand Name Dispense Instructions for use Diagnosis    ASPIRIN PO      Take 81 mg by mouth daily        ATIVAN PO      Take 0.5 mg by mouth as needed for anxiety        COZAAR PO      Take 50 mg by mouth every morning        GLIPIZIDE PO      Take 10 mg by mouth 2 times daily (before meals)        hydrochlorothiazide 12.5 MG capsule    MICROZIDE     Take 25 mg by mouth daily        LAMICTAL PO      Take 100 mg by mouth 2 times daily 100 mg in the AM, 200 mg at HS        LANTUS SOLOSTAR SC      Inject  40 Units Subcutaneous At Bedtime        loperamide 2 MG capsule    IMODIUM     Take 4 mg by mouth daily as needed for diarrhea        MECLIZINE HCL PO      Take 25 mg by mouth daily        metFORMIN 500 MG 24 hr tablet    GLUCOPHAGE-XR     Take 500 mg by mouth 2 times daily (with meals)        METOPROLOL TARTRATE PO      Take 25 mg by mouth 3 times daily        PAXIL PO      Take 40 mg by mouth daily

## 2017-12-06 NOTE — PATIENT INSTRUCTIONS
Preparing for Your Surgery      Name:  Ginger Diaz   MRN:  [id number]   :  1950   Today's Date:  2017     Arriving for surgery:  Surgery date:  2017  Arrival time:  6:00am  Please come to:       [facility] [address]  [address] [place]  [address]    -   parking is available in front of the hospital from 5:15 am to 8:00 pm    -  Stop at the Information Desk in the lobby    -   Inform the information person that you are here for surgery. An escort to 3c will be provided. If you would not like an escort, please proceed to 3C on the 3rd floor. [phone]     What can I eat or drink?  -  You may have solid food or milk products until 8 hours prior to your surgery.  -  You may have water, apple juice or 7up/Sprite until 2 hours prior to your surgery.    Which medicines can I take?  -  Do NOT take these medications in the morning, the day of surgery:  Losartan, vitamins       Continue to hold aspirin as directed    -  Please take these medications the day of surgery:  Take 80% of Lantus (32units), metoprolol,synthroid, meclizine and lorazepam as needed    How do I prepare myself?  -  Take two showers: one the night before surgery; and one the morning of surgery.         Use Scrubcare or Hibiclens to wash from neck down.  You may use your own shampoo and conditioner. No other hair products.   -  Do NOT use lotion, powder, deodorant, or antiperspirant the day of your surgery.  -  Do NOT wear any makeup, fingernail polish or jewelry.  -  Begin using Incentive Spirometer 1 week prior to surgery.  Use 4 times per day, up to 5 breaths each time.  Bring Incentive Spirometer to hospital.  -Do not bring your own medications to the hospital, except for inhalers and eye drops.  -  Bring your ID and insurance card.    Questions or Concerns:  If you have questions or concerns, please call the  Preoperative Assessment Center, Monday-Friday 7AM-7PM:   [phone]    AFTER YOUR SURGERY  Breathing exercises   Breathing exercises help you recover faster. Take deep breaths and let the air out slowly. This will:     Help you wake up after surgery.    Help prevent complications like pneumonia.  Preventing complications will help you go home sooner.   We may give you a breathing device (incentive spirometer) to encourage you to breathe deeply.   Nausea and vomiting   You may feel sick to your stomach after surgery; if so, let your nurse know.    Pain control:  After surgery, you may have pain. Our goal is to help you manage your pain. Pain medicine will help you feel comfortable enough to do activities that will help you heal.  These activities may include breathing exercises, walking and physical therapy.   To help your health care team treat your pain we will ask: 1) If you have pain  2) where it is located 3) describe your pain in your words  Methods of pain control include medications given by mouth, vein or by nerve block for some surgeries.  We may give you a pain control pump that will:  1) Deliver the medicine through a tube placed in your vein  2) Control the amount of medicine you receive  3) Allow you to push a button to deliver a dose of pain medicine  Sequential Compression Device (SCD) or Pneumo Boots:  You may need to wear SCD S on your legs or feet. These are wraps connected to a machine that pumps in air and releases it. The repeated pumping helps prevent blood clots from forming.     Using an Incentive Spirometer    An incentive spirometer is a device that helps you do deep breathing exercises. These exercises expand your lungs, aid in circulation, and help prevent pneumonia. Deep breathing exercises also help you breathe better and improve the function of your lungs by:    Keeping your lungs clear    Strengthening your breathing muscles    Helping prevent respiratory complications or problems  The incentive spirometer gives you a way to take an  active part in recover. A nurse or therapist will teach you breathing exercises. To do these exercises, you will breathe in through your mouth and not your nose. The incentive spirometer only works correctly if you breathe in through your mouth.  Steps to clear lungs  Step 1. Exhale normally. Then, inhale normally.    Relax and breathe out.  Step 2. Place your lips tightly around the mouthpiece.    Make sure the device is upright and not tilted.  Step 3. Inhale as much air as you can through the mouthpiece (don't breath through your nose).    Inhale slowly and deeply.    Hold your breath long enough to keep the balls or disk raised for at least 3 to 5 seconds, or as instructed by your healthcare provider.    Some spirometers have an indicator to let you know that you are breathing in too fast. If the indicator goes off, breathe in more slowly.  Step 4. Repeat the exercise regularly.    Do this exercise every hour while you're awake, or as instructed by your healthcare provider.    If you were taught deep breathing and coughing exercises, do them regularly as instructed by your healthcare provider.   Follow-up care  Make a follow up appointment, or as directed. Also, follow up with your healthcare provider as advised or if your symptoms do not improve or continue to get worse.  When to call your healthcare provider  Call your healthcare provider right away if you have any of the following:    Fever 100.4  (38 C) or higher, or as directed by your healthcare provider    Brownish, bloody, or smelly sputum  Call 911  Call 911 if any of these occur:     Shortness of breath that doesn't get better after taking your medicine    Cool, moist, pale or blue skin    Trouble breathing or swallowing, wheezing    Fainting or loss of consciousness    Feeling of fizziness or weakness or a sudden drop in blood pressure    Feeling of doom or lightheaded    Chest pain or rapid heart rate  Date Last Reviewed: 1/1/2017 2000-2017 [employer]. [address] [place] [address], [place] [address]. All rights reserved. This information is not intended as a substitute for professional medical care. Always follow your healthcare professional's instructions.

## 2017-12-06 NOTE — MR AVS SNAPSHOT
After Visit Summary   12/6/2017    Ginger Diaz    MRN: [id number]           Patient Information     Date Of Birth          1950        Visit Information        Provider Department      12/6/2017 10:45 AM Pharmacist, George Harrison Count includes the [facility] [place]        Today's Diagnoses     Preop examination    -  1       Follow-ups after your visit        Your next 10 appointments already scheduled     Dec 06, 2017 12:00 PM CST   (Arrive by 11:45 AM)   PAC RN ASSESSMENT with George Pac Rn   Count includes the [facility] [place] ([facility])    [address] [place] [address]   [phone]            Dec 06, 2017 12:40 PM CST   (Arrive by 12:25 PM)   PAC Anesthesia Consult with George Pac Anesthesiologist   [facility] ([facility])    [address] [place] [address]   [phone]            Dec 06, 2017  1:00 PM CST   LAB with GEORGE LAB   [facility])    [address] [place] [address]   [phone]           Please do not eat 10-12 hours before your appointment if you are coming in fasting for labs on lipids, cholesterol, or glucose (sugar). This does not apply to pregnant women. Water, hot tea and black coffee (with nothing added) are okay. Do not drink other fluids, diet soda or chew gum.            Dec 06, 2017  2:00 PM CST   MR PELVIS W/O & W CONTRAST with UUMR2   [facility] [place], [facility] ([facility] [place])    [address] [facility] [address]   [phone]           Take your medicines as usual, unless your doctor tells you not to. Bring a list of your current medicines to your exam (including vitamins, minerals and over-the-counter drugs).  You will be given intravenous contrast for this exam. To prepare:   The day before your exam, drink  extra fluids at least six 8-ounce glasses (unless your doctor tells you to restrict your fluids).   Have a blood test (creatinine test) within 30 days of your exam. Go to your clinic or Diagnostic Imaging Department for this test.  The MRI machine uses a strong magnet. Please wear clothes without metal (snaps, zippers). A sweatsuit works well, or we may give you a hospital gown.  Please remove any body piercings and hair extensions before you arrive. You will also remove watches, jewelry, hairpins, wallets, dentures, partial dental plates and hearing aids. You may wear contact lenses, and you may be able to wear your rings. We have a safe place to keep your personal items, but it is safer to leave them at home.   **IMPORTANT** THE INSTRUCTIONS BELOW ARE ONLY FOR THOSE PATIENTS WHO HAVE BEEN TOLD THEY WILL RECEIVE SEDATION OR GENERAL ANESTHESIA DURING THEIR MRI PROCEDURE:  IF YOU WILL RECEIVE SEDATION (take medicine to help you relax during your exam):   You must get the medicine from your doctor before you arrive. Bring the medicine to the exam. Do not take it at home.   Arrive one hour early. Bring someone who can take you home after the test. Your medicine will make you sleepy. After the exam, you may not drive, take a bus or take a taxi by yourself.   No eating 8 hours before your exam. You may have clear liquids up until 4 hours before your exam. (Clear liquids include water, clear tea, black coffee and fruit juice without pulp.)  IF YOU WILL RECEIVE ANESTHESIA (be asleep for your exam):   Arrive 1 1/2 hours early. Bring someone who can take you home after the test. You may not drive, take a bus or take a taxi by yourself.   No eating 8 hours before your exam. You may have clear liquids up until 4 hours before your exam. (Clear liquids include water, clear tea, black coffee and fruit juice without pulp.)  Please call the Imaging Department at your exam site with any questions.            Dec 12, 2017  7:55 AM  CST   (Arrive by 6:00 AM)   TCT/SIM Suite Visit with Ema Valera MD   Radiation Oncology Clinic ([facility])    [facility]  1st Floor  [address] [facility] [address]   [phone]            Dec 12, 2017   Procedure with Ema Valera MD   [facility], [place], Same Day Surgery (--)    [address] [facility] [address]   [phone]            Dec 12, 2017 11:30 AM CST   TCT/SIM Suite Visit with Ema Valera MD   Radiation Oncology Clinic ([facility])    [facility]  1st Floor  [address] [facility] [address]   [phone]            Dec 12, 2017  3:00 PM CST   TCT/SIM Suite Visit with Ema Valera MD   Radiation Oncology Clinic ([facility])    [facility]  1st Floor  [address] [facility] [address]   [phone]            Dec 13, 2017  9:00 AM CST   TCT/SIM Suite Visit with Ema Valera MD   Radiation Oncology Clinic ([facility])    [facility]  1st Floor  [address] [facility] [address]   [phone]            Dec 13, 2017  3:00 PM CST   TCT/SIM Suite Visit with Ema Valera MD   Radiation Oncology Clinic ([facility])    [facility]  1st Floor  [address] [facility] [address]   [phone]              Who to contact     Please call your clinic at [phone] to:    Ask questions about your health    Make or cancel appointments    Discuss your medicines    Learn about your test results    Speak to your doctor   If you have compliments or concerns about an experience at your clinic, or if you wish to file a complaint, please contact [facility] Patient Relations at [phone] or email us at [email or web address]         Additional Information About Your Visit        MyChart Information     MyChart  is an electronic gateway that provides easy, online access to your medical records. With [employer], you can request a clinic appointment, read your test results, renew a prescription or communicate with your care team.     To sign up for [employer] visit the website at [email or web address]   You will be asked to enter the access code listed below, as well as some personal information. Please follow the directions to create your username and password.     Your access code is: [email or web address]  Expires: 2018  9:36 AM     Your access code will  in 90 days. If you need help or a new code, please contact your [facility] or call [phone] for assistance.        Care EveryWhere ID     This is your Care EveryWhere ID. This could be used by other organizations to access your [place] medical records  [id number]         Blood Pressure from Last 3 Encounters:   17 189/64   17 143/78   10/03/17 182/71    Weight from Last 3 Encounters:   17 110.5 kg (243 lb 9.6 oz)   17 110.5 kg (243 lb 8 oz)   10/03/17 115.2 kg (254 lb)              Today, you had the following     No orders found for display         Today's Medication Changes          These changes are accurate as of: 17 11:30 AM.  If you have any questions, ask your nurse or doctor.               These medicines have changed or have updated prescriptions.        Dose/Directions    GLIPIZIDE PO   This may have changed:  Another medication with the same name was removed. Continue taking this medication, and follow the directions you see here.   Changed by:  Pharmacist, [facility] Pac        Dose:  10 mg   Take 10 mg by mouth 2 times daily (before meals)   Refills:  0         Stop taking these medicines if you haven't already. Please contact your care team if you have questions.     MULTIPLE VITAMIN PO   Stopped by:  George Almazan                    Primary Care Provider Office Phone # Fax #    [facility]  [phone]       [facility] [address] [place] [address]        Equal Access to Services     LEOLA LINARES : Mustapha tricia vides landon Barrera, jannet miladysmelvina, tray kaolga osborne, josé luis harrisonin hayaan osminshilpa penn ladyllanpepe wilma. So [facility] [phone].    ATENCIÓN: Si habla español, tiene a jenkins disposición servicios gratuitos de asistencia lingüística. Llame al [phone].    We comply with applicable federal civil rights laws and [place] laws. We do not discriminate on the basis of race, color, national origin, age, disability, sex, sexual orientation, or gender identity.            Thank you!     Thank you for choosing [facility] PREOPERATIVE ASSESSMENT [place]  for your care. Our goal is always to provide you with excellent care. Hearing back from our patients is one way we can continue to improve our services. Please take a few minutes to complete the written survey that you may receive in the mail after your visit with us. Thank you!             Your Updated Medication List - Protect others around you: Learn how to safely use, store and throw away your medicines at www.disposemymeds.org.          This list is accurate as of: 12/6/17 11:30 AM.  Always use your most recent med list.                   Brand Name Dispense Instructions for use Diagnosis    ASPIRIN PO      Take 81 mg by mouth daily        ATIVAN PO      Take 0.5 mg by mouth as needed for anxiety        COZAAR PO      Take 50 mg by mouth every morning        GLIPIZIDE PO      Take 10 mg by mouth 2 times daily (before meals)        hydrochlorothiazide 12.5 MG capsule    MICROZIDE     Take 25 mg by mouth daily        LAMICTAL PO      Take 100 mg by mouth 2 times daily 100 mg in the AM, 200 mg at HS        LANTUS SOLOSTAR SC      Inject 40 Units Subcutaneous At Bedtime        loperamide 2 MG capsule    IMODIUM     Take 4 mg by mouth daily as needed for diarrhea        MECLIZINE HCL PO      Take 25 mg by mouth daily         metFORMIN 500 MG 24 hr tablet    GLUCOPHAGE-XR     Take 500 mg by mouth 2 times daily (with meals)        METOPROLOL TARTRATE PO      Take 25 mg by mouth 3 times daily        PAXIL PO      Take 40 mg by mouth daily

## 2017-12-06 NOTE — ANESTHESIA PREPROCEDURE EVALUATION
Anesthesia Evaluation     . Pt has had prior anesthetic. Type: General, MAC and Regional    History of anesthetic complications   - PONV  Slow to wake      ROS/MED HX    ENT/Pulmonary: Comment: Neg sleep study    (+)Treatment: Inhaler prn,  , . .   (-) tobacco use and sleep apneaAsthma:  No issues for years.   Neurologic:     (+)other neuro History of Bell's palsy in 2016 right face, some right face numbness    Cardiovascular:     (+) Dyslipidemia, hypertension----. : . . . :. Other, . Previous cardiac testing Echodate:7/5/17results:Final Conclusion   The ejection fraction is visually estimated at 60-65%.   Mild left ventricular hypertrophy.   Mild left atrial enlargement.   No significant valvular heart disease noted.   Estimated EF: 60-65%  date: results:ECG reviewed date:2017 results:SB date: results:         (-) taking anticoagulants/antiplateletsDysrhythmias:  SVT, medically managed.   METS/Exercise Tolerance: Comment: Able to walk one block 1 - Eating, dressing   Hematologic:  - neg hematologic  ROS       Musculoskeletal:   (+) arthritis, , , -       GI/Hepatic:  - neg GI/hepatic ROS       Renal/Genitourinary:  - ROS Renal section negative       Endo:     (+) type II DM Last HgA1c: 7.8 date: 10/30/17 Using insulin - not using insulin pump Diabetic complications: neuropathy, thyroid problem hypothyroidism, Obesity, .      Psychiatric:     (+) psychiatric history anxiety and bipolar      Infectious Disease:  - neg infectious disease ROS       Malignancy:   (+) Malignancy History of Other  Other CA recurrent endometrial cancer Active status post Surgery and Radiation         Other:    (+) No chance of pregnancy C-spine cleared: N/A, no H/O Chronic Pain,no other significant disability                    Physical Exam      Airway   Mallampati: II  TM distance: >3 FB  Neck ROM: full    Dental   (+) missing    Cardiovascular   Rhythm and rate: regular and normal      Pulmonary    breath sounds clear to  auscultation    Other findings: Cardiac event monitor 6/16/17  There was a total of 322 hours recorded;  79% of that was readable data.  Average heart rate during this monitoring was 74 beats per minute. There were no pauses greater than 2 seconds.  Bradycardia compromised 6%, while tachycardia compromised 3% of readable data. There were 2 manually triggered recordings.  One of the manually triggered recordings with complaints of lightheaded and dizziness as well as fluttering in the chest is consistent with initiation of a regular wide complex tachycardia at 120 beats per minute.  This shows initially sinus rhythm with PVC followed by a pause and then a regular wide complex tachycardia given the patient's history of supraventricular tachycardia in the past as well as a bit of an initial warm up with the first few complexes looking different than the following complexes and the tachycardia having a short RP configuration.  I would favor supraventricular tachycardia with aberancy as the mechanism.  Only the initiation is seen on available  tracings.        For further details of assessment, testing, and physical exam please see H and P completed on same date.           PAC Discussion and Assessment    ASA Classification: 3  Case is suitable for: [place]  Anesthetic techniques and relevant risks discussed: GA and GA with regional block for post-op pain control  Invasive monitoring and risk discussed: No  Types:   Possibility and Risk of blood transfusion discussed: No  NPO instructions given:   Additional anesthetic preparation and risks discussed:   Needs early admission to pre-op area:   Other:     PAC Resident/NP Anesthesia Assessment:  Ginger Diaz is a 67 year old female scheduled to undergo laparoscopic, US guided placement of interstitial needles on 12/12/17, followed by removal of interstitial needles on 12/14/17 by Ofelia Valera and Kathia. She has the following specific operative considerations:   -  RCRI : No serious cardiac risks.     - VTE risk: 3%  - OFELIA # of risks 3/8 = Intermediate risk   - Risk of PONV score = 3.  If > 2, anti-emetic intervention recommended. If 3 or > anti emetic intervention recommended with two or more meds    Last procedure 10/10/17 for cysto, colonoscopy with MAC. PONV and dizziness.     --Recurrent endometrial cancer. S/p 5 weeks external beam radiation. Above procedure now planned.    --PONV. Significant history. Final decisions regarding prophylaxis by Anesthesia on DOS.   --HLD, HYPERTENSION. Will hold Losartan on DOS. History of SVT with no intervention. On Metoprolol THREE TIMES DAILY and will take on DOS. Last episode 2-3 years ago. More recent return to Cardiology for possible syncope. Cardiac event monitor showing episode of wide complex tachycardia which was symptomatic. Echocardiogram above. Recommended that she reestablish with EP physician for further recommendations. Testing above. Doesn't do much exercise. Currently able to walk one block without need for stopping.    --Nonsmoker. Remote history of asthma with no recent symptoms or use of inhaler. History of neg sleep study.    --Hypothyroidism. Will take Synthroid on DOS.   --Pain management. Discussed possibility of nerve block if appropriate for patient's procedure. Epidural recommended. Final counseling and decisions by regional team on DOS.   --DIABETES MELLITUS II. Last A1C 7.8. Will take 40 units of Lantus insulin on evening before surgery. Neuropathy feet.   --History of Bell's palsy right face in 2016. Resolved, although has had some return of numbness after recent radiation therapy.   --Bipolar disorder. Occasional Ativan for anxiety.    --Patient has several medications prescribed that she is not taking, listed on med list.     Patient was discussed with Dr De Leon.      Reviewed and Signed by PAC Mid-Level Provider/Resident  Mid-Level Provider/Resident: ANITA Titus, CNS  Date: 12/6/17  Time:  "12:23pm    Attending Anesthesiologist Anesthesia Assessment:  STAFF:  67 y.o. Bipolar woman with recurrent local \"cuff\" metastasis disease from endometrial CA resection MARIO in 2011 for interstitial needle RT by Dr. PHAM  Using EPIDURAL + MAC/general anesthesia.   History summarized above.  Patient with past anesthetic Hx of slow-emergence from anesthesia, and post-op dizziness (this later issue may have been related to application of a scopolamine path).   Instructions given and questions answered.   Final plans by anesthesiology team on DOS.   ---rcp      Reviewed and Signed by PAC Anesthesiologist  Anesthesiologist: reagan  Date: 12/6  Time:   Pass/Fail: Pass  Disposition:     PAC Pharmacist Assessment:        Pharmacist:   Date:   Time:      Anesthesia Plan      History & Physical Review  History and physical reviewed and following examination; no interval change.    ASA Status:  2 .    NPO Status:  > 6 hours    Plan for General and ETT with Intravenous induction. Maintenance will be TIVA.    PONV prophylaxis:  Ondansetron (or other 5HT-3) and Dexamethasone or Solumedrol       Postoperative Care  Postoperative pain management:  Oral pain medications and Multi-modal analgesia.      Consents  Anesthetic plan, risks, benefits and alternatives discussed with:  Patient..                          .  "

## 2017-12-07 ENCOUNTER — TRANSFERRED RECORDS (OUTPATIENT)
Dept: HEALTH INFORMATION MANAGEMENT | Facility: CLINIC | Age: 67
End: 2017-12-07

## 2017-12-07 NOTE — TELEPHONE ENCOUNTER
RT treatment summary received from [facility] Radiation - sent to HIM for scanning and emailed to clinic for addition to radiation chart.

## 2017-12-12 ENCOUNTER — HOSPITAL ENCOUNTER (OUTPATIENT)
Dept: ULTRASOUND IMAGING | Facility: CLINIC | Age: 67
DRG: 745 | End: 2017-12-12
Attending: RADIOLOGY | Admitting: RADIOLOGY
Payer: COMMERCIAL

## 2017-12-12 ENCOUNTER — ANESTHESIA EVENT (OUTPATIENT)
Dept: SURGERY | Facility: CLINIC | Age: 67
DRG: 745 | End: 2017-12-12
Payer: COMMERCIAL

## 2017-12-12 ENCOUNTER — SURGERY (OUTPATIENT)
Age: 67
End: 2017-12-12
Payer: COMMERCIAL

## 2017-12-12 ENCOUNTER — ALLIED HEALTH/NURSE VISIT (OUTPATIENT)
Dept: RADIATION ONCOLOGY | Facility: CLINIC | Age: 67
DRG: 745 | End: 2017-12-12
Attending: RADIOLOGY
Payer: COMMERCIAL

## 2017-12-12 ENCOUNTER — ANESTHESIA (OUTPATIENT)
Dept: SURGERY | Facility: CLINIC | Age: 67
DRG: 745 | End: 2017-12-12
Payer: COMMERCIAL

## 2017-12-12 ENCOUNTER — HOSPITAL ENCOUNTER (INPATIENT)
Facility: CLINIC | Age: 67
LOS: 2 days | Discharge: HOME | DRG: 745 | End: 2017-12-14
Attending: RADIOLOGY | Admitting: OBSTETRICS & GYNECOLOGY
Payer: COMMERCIAL

## 2017-12-12 DIAGNOSIS — C54.1 ENDOMETRIAL CANCER (H): ICD-10-CM

## 2017-12-12 DIAGNOSIS — C54.1 RECURRENT CARCINOMA OF ENDOMETRIUM (H): Primary | ICD-10-CM

## 2017-12-12 DIAGNOSIS — C54.1 ENDOMETRIAL CANCER (H): Primary | ICD-10-CM

## 2017-12-12 LAB
ABO + RH BLD: NORMAL
ABO + RH BLD: NORMAL
BLD GP AB SCN SERPL QL: NORMAL
BLOOD BANK CMNT PATIENT-IMP: NORMAL
CREAT SERPL-MCNC: 0.63 MG/DL (ref 0.52–1.04)
ERYTHROCYTE [DISTWIDTH] IN BLOOD BY AUTOMATED COUNT: 14.6 % (ref 10–15)
GFR SERPL CREATININE-BSD FRML MDRD: >90 ML/MIN/1.7M2
GLUCOSE BLDC GLUCOMTR-MCNC: 112 MG/DL (ref 70–99)
GLUCOSE BLDC GLUCOMTR-MCNC: 114 MG/DL (ref 70–99)
GLUCOSE BLDC GLUCOMTR-MCNC: 167 MG/DL (ref 70–99)
GLUCOSE BLDC GLUCOMTR-MCNC: 174 MG/DL (ref 70–99)
HCT VFR BLD AUTO: 34.8 % (ref 35–47)
HGB BLD-MCNC: 11.7 G/DL (ref 11.7–15.7)
MCH RBC QN AUTO: 30.8 PG (ref 26.5–33)
MCHC RBC AUTO-ENTMCNC: 33.6 G/DL (ref 31.5–36.5)
MCV RBC AUTO: 92 FL (ref 78–100)
PLATELET # BLD AUTO: 166 10E9/L (ref 150–450)
RBC # BLD AUTO: 3.8 10E12/L (ref 3.8–5.2)
SPECIMEN EXP DATE BLD: NORMAL
WBC # BLD AUTO: 5.3 10E9/L (ref 4–11)

## 2017-12-12 PROCEDURE — 40000170 ZZH STATISTIC PRE-PROCEDURE ASSESSMENT II: Performed by: RADIOLOGY

## 2017-12-12 PROCEDURE — 58660 LAPAROSCOPY LYSIS: CPT | Mod: GC | Performed by: OBSTETRICS & GYNECOLOGY

## 2017-12-12 PROCEDURE — 25000128 H RX IP 250 OP 636: Performed by: ANESTHESIOLOGY

## 2017-12-12 PROCEDURE — 85027 COMPLETE CBC AUTOMATED: CPT | Performed by: STUDENT IN AN ORGANIZED HEALTH CARE EDUCATION/TRAINING PROGRAM

## 2017-12-12 PROCEDURE — 27210794 ZZH OR GENERAL SUPPLY STERILE: Performed by: RADIOLOGY

## 2017-12-12 PROCEDURE — 86900 BLOOD TYPING SEROLOGIC ABO: CPT | Performed by: STUDENT IN AN ORGANIZED HEALTH CARE EDUCATION/TRAINING PROGRAM

## 2017-12-12 PROCEDURE — 25000132 ZZH RX MED GY IP 250 OP 250 PS 637: Performed by: NURSE PRACTITIONER

## 2017-12-12 PROCEDURE — 25800025 ZZH RX 258: Performed by: NURSE PRACTITIONER

## 2017-12-12 PROCEDURE — 25000128 H RX IP 250 OP 636: Performed by: STUDENT IN AN ORGANIZED HEALTH CARE EDUCATION/TRAINING PROGRAM

## 2017-12-12 PROCEDURE — 36000059 ZZH SURGERY LEVEL 3 EA 15 ADDTL MIN UMMC: Performed by: RADIOLOGY

## 2017-12-12 PROCEDURE — 76857 US EXAM PELVIC LIMITED: CPT | Mod: TC

## 2017-12-12 PROCEDURE — 00000146 ZZHCL STATISTIC GLUCOSE BY METER IP

## 2017-12-12 PROCEDURE — 82565 ASSAY OF CREATININE: CPT | Performed by: NURSE PRACTITIONER

## 2017-12-12 PROCEDURE — DW1698Z HIGH DOSE RATE (HDR) BRACHYTHERAPY OF PELVIC REGION USING IRIDIUM 192 (IR-192): ICD-10-PCS | Performed by: RADIOLOGY

## 2017-12-12 PROCEDURE — 25000132 ZZH RX MED GY IP 250 OP 250 PS 637: Performed by: OBSTETRICS & GYNECOLOGY

## 2017-12-12 PROCEDURE — 37000008 ZZH ANESTHESIA TECHNICAL FEE, 1ST 30 MIN: Performed by: RADIOLOGY

## 2017-12-12 PROCEDURE — 77470 SPECIAL RADIATION TREATMENT: CPT | Performed by: RADIOLOGY

## 2017-12-12 PROCEDURE — 25000132 ZZH RX MED GY IP 250 OP 250 PS 637: Performed by: STUDENT IN AN ORGANIZED HEALTH CARE EDUCATION/TRAINING PROGRAM

## 2017-12-12 PROCEDURE — 77772 HDR RDNCL NTRSTL/ICAV BRCHTX: CPT | Performed by: RADIOLOGY

## 2017-12-12 PROCEDURE — 0WHJ41Z INSERTION OF RADIOACTIVE ELEMENT INTO PELVIC CAVITY, PERCUTANEOUS ENDOSCOPIC APPROACH: ICD-10-PCS | Performed by: RADIOLOGY

## 2017-12-12 PROCEDURE — C9399 UNCLASSIFIED DRUGS OR BIOLOG: HCPCS | Performed by: NURSE ANESTHETIST, CERTIFIED REGISTERED

## 2017-12-12 PROCEDURE — 77290 THER RAD SIMULAJ FIELD CPLX: CPT | Performed by: RADIOLOGY

## 2017-12-12 PROCEDURE — 71000014 ZZH RECOVERY PHASE 1 LEVEL 2 FIRST HR: Performed by: RADIOLOGY

## 2017-12-12 PROCEDURE — 86850 RBC ANTIBODY SCREEN: CPT | Performed by: STUDENT IN AN ORGANIZED HEALTH CARE EDUCATION/TRAINING PROGRAM

## 2017-12-12 PROCEDURE — 77332 RADIATION TREATMENT AID(S): CPT | Performed by: RADIOLOGY

## 2017-12-12 PROCEDURE — 71000015 ZZH RECOVERY PHASE 1 LEVEL 2 EA ADDTL HR: Performed by: RADIOLOGY

## 2017-12-12 PROCEDURE — 25000565 ZZH ISOFLURANE, EA 15 MIN: Performed by: RADIOLOGY

## 2017-12-12 PROCEDURE — 25000131 ZZH RX MED GY IP 250 OP 636 PS 637: Performed by: STUDENT IN AN ORGANIZED HEALTH CARE EDUCATION/TRAINING PROGRAM

## 2017-12-12 PROCEDURE — 25000125 ZZHC RX 250: Performed by: ANESTHESIOLOGY

## 2017-12-12 PROCEDURE — 0DNN4ZZ RELEASE SIGMOID COLON, PERCUTANEOUS ENDOSCOPIC APPROACH: ICD-10-PCS | Performed by: OBSTETRICS & GYNECOLOGY

## 2017-12-12 PROCEDURE — 0DJD8ZZ INSPECTION OF LOWER INTESTINAL TRACT, VIA NATURAL OR ARTIFICIAL OPENING ENDOSCOPIC: ICD-10-PCS | Performed by: OBSTETRICS & GYNECOLOGY

## 2017-12-12 PROCEDURE — 82962 GLUCOSE BLOOD TEST: CPT

## 2017-12-12 PROCEDURE — 25000125 ZZHC RX 250: Performed by: NURSE ANESTHETIST, CERTIFIED REGISTERED

## 2017-12-12 PROCEDURE — 55920 PLACE NEEDLES PELVIC FOR RT: CPT | Performed by: RADIOLOGY

## 2017-12-12 PROCEDURE — 25000128 H RX IP 250 OP 636: Performed by: NURSE ANESTHETIST, CERTIFIED REGISTERED

## 2017-12-12 PROCEDURE — 36415 COLL VENOUS BLD VENIPUNCTURE: CPT | Performed by: NURSE PRACTITIONER

## 2017-12-12 PROCEDURE — 12000008 ZZH R&B INTERMEDIATE UMMC

## 2017-12-12 PROCEDURE — 25000131 ZZH RX MED GY IP 250 OP 636 PS 637: Performed by: NURSE PRACTITIONER

## 2017-12-12 PROCEDURE — 86901 BLOOD TYPING SEROLOGIC RH(D): CPT | Performed by: STUDENT IN AN ORGANIZED HEALTH CARE EDUCATION/TRAINING PROGRAM

## 2017-12-12 PROCEDURE — 0DNH4ZZ RELEASE CECUM, PERCUTANEOUS ENDOSCOPIC APPROACH: ICD-10-PCS | Performed by: OBSTETRICS & GYNECOLOGY

## 2017-12-12 PROCEDURE — C1717 BRACHYTX, NON-STR,HDR IR-192: HCPCS | Performed by: RADIOLOGY

## 2017-12-12 PROCEDURE — 77295 3-D RADIOTHERAPY PLAN: CPT | Performed by: RADIOLOGY

## 2017-12-12 PROCEDURE — 36000057 ZZH SURGERY LEVEL 3 1ST 30 MIN - UMMC: Performed by: RADIOLOGY

## 2017-12-12 PROCEDURE — 37000009 ZZH ANESTHESIA TECHNICAL FEE, EACH ADDTL 15 MIN: Performed by: RADIOLOGY

## 2017-12-12 RX ORDER — SODIUM CHLORIDE, SODIUM LACTATE, POTASSIUM CHLORIDE, CALCIUM CHLORIDE 600; 310; 30; 20 MG/100ML; MG/100ML; MG/100ML; MG/100ML
INJECTION, SOLUTION INTRAVENOUS CONTINUOUS
Status: DISCONTINUED | OUTPATIENT
Start: 2017-12-12 | End: 2017-12-12 | Stop reason: HOSPADM

## 2017-12-12 RX ORDER — ONDANSETRON 2 MG/ML
INJECTION INTRAMUSCULAR; INTRAVENOUS PRN
Status: DISCONTINUED | OUTPATIENT
Start: 2017-12-12 | End: 2017-12-12

## 2017-12-12 RX ORDER — LAMOTRIGINE 100 MG/1
200 TABLET ORAL EVERY EVENING
Status: DISCONTINUED | OUTPATIENT
Start: 2017-12-12 | End: 2017-12-12

## 2017-12-12 RX ORDER — GLIPIZIDE 10 MG/1
10 TABLET ORAL
Status: DISCONTINUED | OUTPATIENT
Start: 2017-12-12 | End: 2017-12-12

## 2017-12-12 RX ORDER — NALOXONE HYDROCHLORIDE 0.4 MG/ML
.1-.4 INJECTION, SOLUTION INTRAMUSCULAR; INTRAVENOUS; SUBCUTANEOUS
Status: DISCONTINUED | OUTPATIENT
Start: 2017-12-12 | End: 2017-12-12 | Stop reason: HOSPADM

## 2017-12-12 RX ORDER — METOPROLOL TARTRATE 25 MG/1
25 TABLET, FILM COATED ORAL 3 TIMES DAILY
Status: DISCONTINUED | OUTPATIENT
Start: 2017-12-12 | End: 2017-12-14 | Stop reason: HOSPADM

## 2017-12-12 RX ORDER — ACETAMINOPHEN 10 MG/ML
1000 INJECTION, SOLUTION INTRAVENOUS ONCE
Status: COMPLETED | OUTPATIENT
Start: 2017-12-12 | End: 2017-12-12

## 2017-12-12 RX ORDER — NALOXONE HYDROCHLORIDE 0.4 MG/ML
.1-.4 INJECTION, SOLUTION INTRAMUSCULAR; INTRAVENOUS; SUBCUTANEOUS
Status: DISCONTINUED | OUTPATIENT
Start: 2017-12-12 | End: 2017-12-14 | Stop reason: HOSPADM

## 2017-12-12 RX ORDER — FENTANYL CITRATE 50 UG/ML
25-50 INJECTION, SOLUTION INTRAMUSCULAR; INTRAVENOUS
Status: DISCONTINUED | OUTPATIENT
Start: 2017-12-12 | End: 2017-12-12 | Stop reason: HOSPADM

## 2017-12-12 RX ORDER — ASPIRIN 81 MG/1
81 TABLET, CHEWABLE ORAL DAILY
Status: DISCONTINUED | OUTPATIENT
Start: 2017-12-14 | End: 2017-12-14 | Stop reason: HOSPADM

## 2017-12-12 RX ORDER — LOPERAMIDE HCL 2 MG
4 CAPSULE ORAL DAILY PRN
Status: DISCONTINUED | OUTPATIENT
Start: 2017-12-12 | End: 2017-12-14 | Stop reason: HOSPADM

## 2017-12-12 RX ORDER — NALBUPHINE HYDROCHLORIDE 10 MG/ML
2.5-5 INJECTION, SOLUTION INTRAMUSCULAR; INTRAVENOUS; SUBCUTANEOUS EVERY 6 HOURS PRN
Status: DISCONTINUED | OUTPATIENT
Start: 2017-12-12 | End: 2017-12-12

## 2017-12-12 RX ORDER — NICOTINE POLACRILEX 4 MG
15-30 LOZENGE BUCCAL
Status: DISCONTINUED | OUTPATIENT
Start: 2017-12-12 | End: 2017-12-14 | Stop reason: HOSPADM

## 2017-12-12 RX ORDER — NALBUPHINE HYDROCHLORIDE 10 MG/ML
2.5-5 INJECTION, SOLUTION INTRAMUSCULAR; INTRAVENOUS; SUBCUTANEOUS EVERY 6 HOURS PRN
Status: DISCONTINUED | OUTPATIENT
Start: 2017-12-12 | End: 2017-12-13

## 2017-12-12 RX ORDER — MECLIZINE HYDROCHLORIDE 25 MG/1
25 TABLET ORAL DAILY
Status: DISCONTINUED | OUTPATIENT
Start: 2017-12-13 | End: 2017-12-14 | Stop reason: HOSPADM

## 2017-12-12 RX ORDER — ONDANSETRON 2 MG/ML
4 INJECTION INTRAMUSCULAR; INTRAVENOUS EVERY 6 HOURS PRN
Status: DISCONTINUED | OUTPATIENT
Start: 2017-12-12 | End: 2017-12-14 | Stop reason: HOSPADM

## 2017-12-12 RX ORDER — DIMENHYDRINATE 50 MG
50 TABLET ORAL 2 TIMES DAILY
Status: DISCONTINUED | OUTPATIENT
Start: 2017-12-12 | End: 2017-12-14 | Stop reason: HOSPADM

## 2017-12-12 RX ORDER — PAROXETINE 20 MG/1
40 TABLET, FILM COATED ORAL DAILY
Status: DISCONTINUED | OUTPATIENT
Start: 2017-12-13 | End: 2017-12-12

## 2017-12-12 RX ORDER — DIPHENOXYLATE HCL/ATROPINE 2.5-.025MG
1-2 TABLET ORAL 4 TIMES DAILY PRN
Status: DISCONTINUED | OUTPATIENT
Start: 2017-12-12 | End: 2017-12-14 | Stop reason: HOSPADM

## 2017-12-12 RX ORDER — ACETAMINOPHEN 325 MG/1
650 TABLET ORAL EVERY 4 HOURS PRN
Status: DISCONTINUED | OUTPATIENT
Start: 2017-12-12 | End: 2017-12-14 | Stop reason: HOSPADM

## 2017-12-12 RX ORDER — SODIUM CHLORIDE, SODIUM LACTATE, POTASSIUM CHLORIDE, CALCIUM CHLORIDE 600; 310; 30; 20 MG/100ML; MG/100ML; MG/100ML; MG/100ML
INJECTION, SOLUTION INTRAVENOUS CONTINUOUS PRN
Status: DISCONTINUED | OUTPATIENT
Start: 2017-12-12 | End: 2017-12-12

## 2017-12-12 RX ORDER — GLYCOPYRROLATE 0.2 MG/ML
INJECTION, SOLUTION INTRAMUSCULAR; INTRAVENOUS PRN
Status: DISCONTINUED | OUTPATIENT
Start: 2017-12-12 | End: 2017-12-12

## 2017-12-12 RX ORDER — ONDANSETRON 4 MG/1
4 TABLET, ORALLY DISINTEGRATING ORAL EVERY 6 HOURS PRN
Status: DISCONTINUED | OUTPATIENT
Start: 2017-12-12 | End: 2017-12-14 | Stop reason: HOSPADM

## 2017-12-12 RX ORDER — METOPROLOL TARTRATE 25 MG/1
25 TABLET, FILM COATED ORAL 2 TIMES DAILY
Status: DISCONTINUED | OUTPATIENT
Start: 2017-12-12 | End: 2017-12-12

## 2017-12-12 RX ORDER — OXYCODONE HYDROCHLORIDE 5 MG/1
5-10 TABLET ORAL EVERY 4 HOURS PRN
Status: DISCONTINUED | OUTPATIENT
Start: 2017-12-12 | End: 2017-12-14 | Stop reason: HOSPADM

## 2017-12-12 RX ORDER — DEXTROSE MONOHYDRATE 25 G/50ML
25-50 INJECTION, SOLUTION INTRAVENOUS
Status: DISCONTINUED | OUTPATIENT
Start: 2017-12-12 | End: 2017-12-14 | Stop reason: HOSPADM

## 2017-12-12 RX ORDER — ONDANSETRON 4 MG/1
4 TABLET, ORALLY DISINTEGRATING ORAL EVERY 30 MIN PRN
Status: DISCONTINUED | OUTPATIENT
Start: 2017-12-12 | End: 2017-12-12 | Stop reason: HOSPADM

## 2017-12-12 RX ORDER — METOPROLOL TARTRATE 25 MG/1
25 TABLET, FILM COATED ORAL 3 TIMES DAILY
Status: DISCONTINUED | OUTPATIENT
Start: 2017-12-12 | End: 2017-12-12

## 2017-12-12 RX ORDER — PROPOFOL 10 MG/ML
INJECTION, EMULSION INTRAVENOUS PRN
Status: DISCONTINUED | OUTPATIENT
Start: 2017-12-12 | End: 2017-12-12

## 2017-12-12 RX ORDER — LOSARTAN POTASSIUM 50 MG/1
50 TABLET ORAL EVERY MORNING
Status: DISCONTINUED | OUTPATIENT
Start: 2017-12-13 | End: 2017-12-14 | Stop reason: HOSPADM

## 2017-12-12 RX ORDER — HYDROMORPHONE HCL/0.9% NACL/PF 0.2MG/0.2
.2-.3 SYRINGE (ML) INTRAVENOUS
Status: DISCONTINUED | OUTPATIENT
Start: 2017-12-12 | End: 2017-12-14 | Stop reason: HOSPADM

## 2017-12-12 RX ORDER — ONDANSETRON 2 MG/ML
4 INJECTION INTRAMUSCULAR; INTRAVENOUS EVERY 30 MIN PRN
Status: DISCONTINUED | OUTPATIENT
Start: 2017-12-12 | End: 2017-12-12 | Stop reason: HOSPADM

## 2017-12-12 RX ORDER — LORAZEPAM 0.5 MG/1
0.5 TABLET ORAL ONCE
Status: COMPLETED | OUTPATIENT
Start: 2017-12-12 | End: 2017-12-12

## 2017-12-12 RX ORDER — NALOXONE HYDROCHLORIDE 0.4 MG/ML
.1-.4 INJECTION, SOLUTION INTRAMUSCULAR; INTRAVENOUS; SUBCUTANEOUS
Status: DISCONTINUED | OUTPATIENT
Start: 2017-12-12 | End: 2017-12-12

## 2017-12-12 RX ORDER — LIDOCAINE HYDROCHLORIDE 20 MG/ML
INJECTION, SOLUTION INFILTRATION; PERINEURAL PRN
Status: DISCONTINUED | OUTPATIENT
Start: 2017-12-12 | End: 2017-12-12

## 2017-12-12 RX ORDER — CEFAZOLIN SODIUM 1 G/3ML
INJECTION, POWDER, FOR SOLUTION INTRAMUSCULAR; INTRAVENOUS PRN
Status: DISCONTINUED | OUTPATIENT
Start: 2017-12-12 | End: 2017-12-12

## 2017-12-12 RX ORDER — DEXAMETHASONE SODIUM PHOSPHATE 4 MG/ML
INJECTION, SOLUTION INTRA-ARTICULAR; INTRALESIONAL; INTRAMUSCULAR; INTRAVENOUS; SOFT TISSUE PRN
Status: DISCONTINUED | OUTPATIENT
Start: 2017-12-12 | End: 2017-12-12

## 2017-12-12 RX ORDER — LAMOTRIGINE 100 MG/1
100 TABLET ORAL EVERY MORNING
Status: DISCONTINUED | OUTPATIENT
Start: 2017-12-13 | End: 2017-12-12

## 2017-12-12 RX ORDER — FLUMAZENIL 0.1 MG/ML
0.2 INJECTION, SOLUTION INTRAVENOUS
Status: DISCONTINUED | OUTPATIENT
Start: 2017-12-12 | End: 2017-12-12 | Stop reason: HOSPADM

## 2017-12-12 RX ORDER — MEPERIDINE HYDROCHLORIDE 50 MG/ML
12.5 INJECTION INTRAMUSCULAR; INTRAVENOUS; SUBCUTANEOUS
Status: DISCONTINUED | OUTPATIENT
Start: 2017-12-12 | End: 2017-12-12 | Stop reason: HOSPADM

## 2017-12-12 RX ORDER — CEFAZOLIN SODIUM 1 G/3ML
1 INJECTION, POWDER, FOR SOLUTION INTRAMUSCULAR; INTRAVENOUS SEE ADMIN INSTRUCTIONS
Status: DISCONTINUED | OUTPATIENT
Start: 2017-12-12 | End: 2017-12-12 | Stop reason: HOSPADM

## 2017-12-12 RX ORDER — ALBUTEROL SULFATE 90 UG/1
2 AEROSOL, METERED RESPIRATORY (INHALATION) 4 TIMES DAILY PRN
Status: DISCONTINUED | OUTPATIENT
Start: 2017-12-12 | End: 2017-12-14 | Stop reason: HOSPADM

## 2017-12-12 RX ORDER — METOPROLOL TARTRATE 25 MG/1
25 TABLET, FILM COATED ORAL ONCE
Status: COMPLETED | OUTPATIENT
Start: 2017-12-12 | End: 2017-12-12

## 2017-12-12 RX ORDER — LEVOTHYROXINE SODIUM 25 UG/1
25 TABLET ORAL DAILY
Status: DISCONTINUED | OUTPATIENT
Start: 2017-12-13 | End: 2017-12-14 | Stop reason: HOSPADM

## 2017-12-12 RX ORDER — MECLIZINE HCL 25MG 25 MG/1
25 TABLET, CHEWABLE ORAL DAILY
Status: DISCONTINUED | OUTPATIENT
Start: 2017-12-13 | End: 2017-12-12

## 2017-12-12 RX ORDER — KETOROLAC TROMETHAMINE 30 MG/ML
INJECTION, SOLUTION INTRAMUSCULAR; INTRAVENOUS PRN
Status: DISCONTINUED | OUTPATIENT
Start: 2017-12-12 | End: 2017-12-12

## 2017-12-12 RX ADMIN — FENTANYL CITRATE 50 MCG: 50 INJECTION INTRAMUSCULAR; INTRAVENOUS at 07:10

## 2017-12-12 RX ADMIN — METOPROLOL TARTRATE 25 MG: 25 TABLET ORAL at 16:05

## 2017-12-12 RX ADMIN — BUPIVACAINE HYDROCHLORIDE 12 ML/HR: 7.5 INJECTION, SOLUTION EPIDURAL; RETROBULBAR at 10:00

## 2017-12-12 RX ADMIN — ONDANSETRON 4 MG: 2 INJECTION INTRAMUSCULAR; INTRAVENOUS at 11:44

## 2017-12-12 RX ADMIN — DIMENHYDRINATE 50 MG: 50 TABLET ORAL at 18:26

## 2017-12-12 RX ADMIN — ONDANSETRON 4 MG: 2 INJECTION INTRAMUSCULAR; INTRAVENOUS at 16:02

## 2017-12-12 RX ADMIN — DEXTROSE AND SODIUM CHLORIDE: 5; 450 INJECTION, SOLUTION INTRAVENOUS at 12:19

## 2017-12-12 RX ADMIN — SODIUM CHLORIDE, POTASSIUM CHLORIDE, SODIUM LACTATE AND CALCIUM CHLORIDE: 600; 310; 30; 20 INJECTION, SOLUTION INTRAVENOUS at 08:12

## 2017-12-12 RX ADMIN — ACETAMINOPHEN 1000 MG: 10 INJECTION, SOLUTION INTRAVENOUS at 10:05

## 2017-12-12 RX ADMIN — HYDROMORPHONE HYDROCHLORIDE 0.5 MG: 1 INJECTION, SOLUTION INTRAMUSCULAR; INTRAVENOUS; SUBCUTANEOUS at 08:45

## 2017-12-12 RX ADMIN — INSULIN ASPART 1 UNITS: 100 INJECTION, SOLUTION INTRAVENOUS; SUBCUTANEOUS at 18:22

## 2017-12-12 RX ADMIN — KETOROLAC TROMETHAMINE 15 MG: 30 INJECTION, SOLUTION INTRAMUSCULAR at 10:27

## 2017-12-12 RX ADMIN — METOPROLOL TARTRATE 25 MG: 25 TABLET ORAL at 23:23

## 2017-12-12 RX ADMIN — OXYCODONE HYDROCHLORIDE 5 MG: 5 TABLET ORAL at 21:08

## 2017-12-12 RX ADMIN — METOPROLOL TARTRATE 25 MG: 25 TABLET, FILM COATED ORAL at 18:26

## 2017-12-12 RX ADMIN — LIDOCAINE HYDROCHLORIDE 100 MG: 20 INJECTION, SOLUTION INFILTRATION; PERINEURAL at 08:25

## 2017-12-12 RX ADMIN — FENTANYL CITRATE 50 MCG: 50 INJECTION INTRAMUSCULAR; INTRAVENOUS at 07:30

## 2017-12-12 RX ADMIN — DEXAMETHASONE SODIUM PHOSPHATE 4 MG: 4 INJECTION, SOLUTION INTRA-ARTICULAR; INTRALESIONAL; INTRAMUSCULAR; INTRAVENOUS; SOFT TISSUE at 08:45

## 2017-12-12 RX ADMIN — HYDROMORPHONE HYDROCHLORIDE 0.5 MG: 1 INJECTION, SOLUTION INTRAMUSCULAR; INTRAVENOUS; SUBCUTANEOUS at 10:24

## 2017-12-12 RX ADMIN — OXYCODONE HYDROCHLORIDE 10 MG: 5 TABLET ORAL at 23:23

## 2017-12-12 RX ADMIN — LORAZEPAM 0.5 MG: 0.5 TABLET ORAL at 21:08

## 2017-12-12 RX ADMIN — PROPOFOL 200 MG: 10 INJECTION, EMULSION INTRAVENOUS at 08:25

## 2017-12-12 RX ADMIN — ROCURONIUM BROMIDE 50 MG: 10 INJECTION INTRAVENOUS at 08:25

## 2017-12-12 RX ADMIN — Medication 0.2 MG: at 08:51

## 2017-12-12 RX ADMIN — OXYCODONE HYDROCHLORIDE 5 MG: 5 TABLET ORAL at 19:25

## 2017-12-12 RX ADMIN — INSULIN GLARGINE 40 UNITS: 100 INJECTION, SOLUTION SUBCUTANEOUS at 23:25

## 2017-12-12 RX ADMIN — SUGAMMADEX 200 MG: 100 INJECTION, SOLUTION INTRAVENOUS at 10:21

## 2017-12-12 RX ADMIN — CEFAZOLIN 2 G: 1 INJECTION, POWDER, FOR SOLUTION INTRAMUSCULAR; INTRAVENOUS at 08:40

## 2017-12-12 RX ADMIN — ONDANSETRON 4 MG: 2 INJECTION INTRAMUSCULAR; INTRAVENOUS at 10:16

## 2017-12-12 NOTE — OP NOTE
PREOPERATIVE DIAGNOSIS:  Recurrent Endometrial Cancer involving the vaginal cuff  POSTOPERATIVE DIAGNOSIS:  Recurrent Endometrial Cancer involving the vaginal cuff    PROCEDURE PERFORMED:    1.  Exam under anesthesia.    2.  Placement of Gunner applicator with insertion of interstitial needles for HDR brachytherapy, under laparoscopic and ultrasound guidance      SURGEONS: Ema Valera MD Radiation Oncology; Josefa Bae MD Gynecologic Oncology      ASSISTANTS:  Brian Cheatham, Resident, Radiation Oncology; Shira Goncalves and Radha Gonzalez, Gynecologic Oncology    ANESTHESIA:  General, endotracheal tube and epidural anesthesia.        COMPLICATIONS:  None.        ESTIMATED BLOOD LOSS:  20 cc      INTRAVENOUS FLUIDS: 900 cc Lactated Ringer's.        OPERATIVE FINDINGS:  Normal external genitalia; vagina smooth with indurated mass at the vaginal cuff.      DESCRIPTION OF OPERATIVE PROCEDURE: Ms. Diaz was brought back to the operating room wearing pneumoboots and identified to be herself.  The patient was placed under general anesthesia via endotracheal tube.  She was placed on the dorsal lithotomy position.  An exam under anesthesia was performed with the above findings. She was prepped and draped in the usual sterile fashion. A time out was performed. A De Jesus catheter was inserted into the bladder and saline used to inflate the balloon.     A stitch was placed into the vagina cuff mucosa, threaded through the vaginal cylinder and secured using a collar. Gynecologic Oncology performed laparoscopic guidance to visualize the vaginal cuff and bowel and perform lysis of adhesions (please see their separate operative report). 4 sutures were placed in the perineum to hold the Gunner template in place. The Gunner applicator was placed and 26 interstitial needles were inserted around the vagina, vaginal cuff, tumor mass and paravaginal  tissues ultrasound and laparoscopic guidance. The sigmoid colon appeared to be adherent  to the vaginal cuff, with possible involvement of tumor in the wall of the sigmoid. Needles were placed in this area in hopes of covering the tumor.    Coloplast padding was placed to protect the skin from the edges of the template. A rectal tube was inserted and the balloon was inflated with 10 cc of sterile fluid for administration of rectal contrast during treatment simulation.    Anesthesia was then reversed and the patient taken to the PACU in stable condition.      Brian Cheatham MD  Radiation Oncology Resident    I was present with the resident during all critical portions of the operation, and immediately available for final wake-up.  I have edited Dr. Cheatham,s note to describe the operative findings and flow.     Ema Valera MD  Radiation Oncology

## 2017-12-12 NOTE — ANESTHESIA PROCEDURE NOTES
Epidural Procedure Note    Staff:     Anesthesiologist:  SANDRA RYDER    Referred By:  Soto  Location: Pre-op     Procedure start time:  12/12/2017 7:15 AM     Procedure end time:  12/12/2017 7:29 AM   Pre-procedure checklist:   patient identified, IV checked, site marked, risks and benefits discussed, informed consent, monitors and equipment checked, pre-op evaluation, at physician/surgeon's request and post-op pain management      Correct Patient: Yes      Correct Position: Yes      Correct Site: Yes      Correct Procedure: Yes      Correct Laterality:  Yes    Site Marked:  Yes  Procedure:     Procedure:  Epidural catheter    ASA:  2    Position:  Sitting    Sterile Prep: chloraprep, mask, sterile gloves and patient draped      Insertion site:  L4-5    Local skin infiltration:  1% lidocaine    Approach:  Midline    Needle gauge (G):  17    Block Needle Type:  Touhy    Injection Technique:  LORT saline    Attempts:  2    Redirects:  1    Catheter gauge (G):  19    Catheter threaded easily: Yes      Paresthesias:  No    Aspiration negative for Heme or CSF: Yes      Test dose negative for signs of intravascular, subdural or intrathecal injection: Yes

## 2017-12-12 NOTE — ANESTHESIA CARE TRANSFER NOTE
Patient: Ginger Diaz    Procedure(s):  Laparoscopic Insertion Interstitial Needle with Ultrasound Guidance, lysis of adhesions - Wound Class: II-Clean Contaminated   - Wound Class: II-Clean Contaminated    Diagnosis: Endometrial Cancer   Diagnosis Additional Information: No value filed.    Anesthesia Type:   General, ETT     Note:  Airway :Face Mask  Patient transferred to:PACU  Comments: VSS on arrival, report to RN.      Vitals: (Last set prior to Anesthesia Care Transfer)    CRNA VITALS  12/12/2017 1018 - 12/12/2017 1053      12/12/2017             Pulse: 70    SpO2: 97 %    Resp Rate (observed): 9                Electronically Signed By: ANITA Tavarez CRNA  December 12, 2017  10:53 AM

## 2017-12-12 NOTE — PLAN OF CARE
Problem: Pain, Acute (Adult)  Goal: Identify Related Risk Factors and Signs and Symptoms  Related risk factors and signs and symptoms are identified upon initiation of Human Response Clinical Practice Guideline (CPG).   Outcome: No Change  Arrived to  around 1440,alert oriented.States she is having minimal pain but states she is unable to feel right foot.Color,motion is WNL,foot remains warm.Epidural had been decreased to 6 cc's/hour.Will call anesthesia,denies any other LAST symptoms.Hypertensive on arrival to ,states she is slightly nauseated,tolerating small amount of po intake.De Jesus patent.Lung sounds clear.Unable to turn at present time to check patency of epidural.Daughter here.Applicance is intact.will transport to radiation therapy shortly.

## 2017-12-12 NOTE — IP AVS SNAPSHOT
MRN:[id number]                      After Visit Summary   12/12/2017    Ginger Diaz    MRN: [id number]           Thank you!     Thank you for choosing [facility] for your care. Our goal is always to provide you with excellent care. Hearing back from our patients is one way we can continue to improve our services. Please take a few minutes to complete the written survey that you may receive in the mail after you visit with us. Thank you!        Patient Information     Date Of Birth          1950        Designated Caregiver       Most Recent Value    Caregiver    Will someone help with your care after discharge? yes    Name of designated caregiver Dianelys-daughter    Phone number of caregiver [phone]    Caregiver address same as pt      About your hospital stay     You were admitted on:  December 12, 2017 You last received care in the:  [address] [place] [address]    You were discharged on:  December 14, 2017        Reason for your hospital stay       Radiation                  Who to Call     For medical emergencies, please call 911.  For non-urgent questions about your medical care, please call your primary care provider or clinic, [phone]  For questions related to your surgery, please call your surgery clinic        Attending Provider     Provider Specialty    Ema Valera MD Radiation Oncology    Josefa Bae MD OB/Gyn       Primary Care Provider Office Phone # Fax #    Casey Sasha [phone]       When to contact your care team       GENERAL POST-OPERATIVE  PATIENT INSTRUCTIONS      FOLLOW-UP:    Call Surgeon if you have:  Temperature greater than 100.4  Persistent nausea and vomiting  Severe uncontrolled pain  Redness, tenderness, or signs of infection (pain, swelling, redness, odor or green/yellow discharge around the site)  Difficulty breathing, headache or visual disturbances  Hives  Persistent dizziness or light-headedness  Extreme fatigue  Any other  questions or concerns you may have after discharge    In an emergency, call 911 or go to an Emergency Department at a nearby hospital       WOUND CARE INSTRUCTIONS:  Keep a dry clean dressing on the wound if there is drainage. If you had a bandage initially, it may be removed after 24 hours.  Once the wound has quit draining you may leave it open to air.  If clothing rubs against the wound or causes irritation and the wound is not draining you may cover it with a dry dressing during the daytime.  Try to keep the wound dry and avoid ointments on the wound unless directed to do so.  If the wound becomes bright red and painful or starts to drain infected material that is not clear, please contact your physician immediately.    1.  You may shower 24 hrs after surgery   2.  No soaking in the tub for 4 weeks       DIET:  There are no dietary restrictions.  You may eat any foods that you can tolerate unless instructed otherwise.  It is a good idea to eat a high fiber diet and take in plenty of fluids to prevent constipation.  If you become constipated, please follow the instructions below.    ACTIVITY:  You are encouraged to cough, deep breath and use your incentive spirometer if you were given one, every 15-30 minutes when awake.  This will help prevent respiratory complications and low grade fevers post-operatively.  You may want to hug a pillow when coughing and sneezing to add additional support to the surgical area, if you had abdominal surgery, which will decrease pain during these times.      1.  No heavy lifting >20lbs or strenuous exercise for six-eight weeks.  No exercise in which you are using core muscles (yoga, pilates, swimming, weight lifting)  2.  You may walk as much as you wish.  You are encouraged to increase your activity each day after surgery.  Stairs are okay.     MEDICATIONS:  Try to take narcotic medications and anti-inflammatory medications, such as tylenol, ibuprofen, naprosyn, etc., with food.   This will minimize stomach upset from the medication.  Should you develop nausea and vomiting from the pain medication, or develop a rash, please discontinue the medication and contact your physician.  You should not drive, make important decisions, or operate machinery when taking narcotic pain medication.    OTHER:  Patients are often constipated after general anesthesia and surgery.  The patient should continue to take stool softeners (for example, Senokot-S) for the next six weeks (unless diarrhea develops) and consume adequate amounts of water.  If the patient remains constipated or unable to pass stool, please try one or all of the following measures:  1.  Milk of Magnesia 30cc twice a day as needed by mouth  2.  Metamucil 2 tablespoons in 12 ounces of fluid  3.  Dulcolax oral or suppositories  4.  Prunes or prune juice  5.  Miralax daily      QUESTIONS:  Please feel free to call your physician or the hospital  if you have any questions, and they will be glad to assist you.                  After Care Instructions     Activity       Your activity upon discharge: activity as tolerated            Diet       Follow this diet upon discharge: Regular diabetic diet                  Follow-up Appointments     Adult [facility] Follow-up and recommended labs and tests       Follow up with your gynecologic oncologist within 1 month at [facility]. Please call our clinic prior to then for questions or concerns [phone]    Appointments on [place] and/or [facility] (with [facility] or [facility] provider or service). Call [phone] if you haven't heard regarding these appointments within 7 days of discharge.                  Pending Results     No orders found from 12/10/2017 to 12/13/2017.            Statement of Approval     Ordered          12/14/17 1941  I have reviewed and agree with all the recommendations and orders detailed in this document.  EFFECTIVE NOW     Approved and electronically signed by:   "Navid Ruelas MD             Admission Information     Date & Time Provider Department Dept. Phone    2017 Josefa Bae MD [address] [facility] [place] [phone]      Your Vitals Were     Blood Pressure Pulse Temperature Respirations Height Weight    169/53 (BP Location: Left arm) 81 98.8  F (37.1  C) (Oral) 18 1.727 m (5' 8\") 109 kg (240 lb 4.8 oz)    Pulse Oximetry BMI (Body Mass Index)                95% 36.54 kg/m2          [email or web address] Information     [facility] lets you send messages to your doctor, view your test results, renew your prescriptions, schedule appointments and more. To sign up, go to [email or web address] . Click on \"Log in\" on the left side of the screen, which will take you to the Welcome page. Then click on \"Sign up Now\" on the right side of the page.     You will be asked to enter the access code listed below, as well as some personal information. Please follow the directions to create your username and password.     Your access code is: [email or web address]  Expires: 2018  9:36 AM     Your access code will  in 90 days. If you need help or a new code, please call your [facility] clinic or [phone].        Care EveryWhere ID     This is your Care EveryWhere ID. This could be used by other organizations to access your [facility] medical records  [id number]        Equal Access to Services     LEOLA LINARES AH: Hadii tricia jango Sophilipp, waaxda luqadaha, qaybta kaalmada [place], josé luis rosas . So [facility] [phone].    ATENCIÓN: Si habla español, tiene a jenkins disposición servicios gratuitos de asistencia lingüística. Llame al [phone].    We comply with applicable federal civil rights laws and [place] laws. We do not discriminate on the basis of race, color, national origin, age, disability, sex, sexual orientation, or gender identity.               Review of your medicines      START taking        Dose / Directions    acetaminophen 325 MG tablet "   Commonly known as:  TYLENOL   Used for:  Recurrent carcinoma of endometrium (H)        Dose:  650 mg   Take 2 tablets (650 mg) by mouth every 4 hours as needed for mild pain or fever   Quantity:  100 tablet   Refills:  0       oxyCODONE IR 5 MG tablet   Commonly known as:  ROXICODONE   Used for:  Recurrent carcinoma of endometrium (H)        Dose:  5-10 mg   Take 1-2 tablets (5-10 mg) by mouth every 4 hours as needed for moderate to severe pain   Quantity:  25 tablet   Refills:  0       senna-docusate 8.6-50 MG per tablet   Commonly known as:  SENOKOT-S;PERICOLACE   Used for:  Recurrent carcinoma of endometrium (H)        Dose:  1-2 tablet   Take 1-2 tablets by mouth 2 times daily as needed for constipation   Quantity:  60 tablet   Refills:  0         CONTINUE these medicines which have NOT CHANGED        Dose / Directions    ASPIRIN PO        Dose:  81 mg   Take 81 mg by mouth daily   Refills:  0       ATIVAN PO        Dose:  0.5 mg   Take 0.5 mg by mouth as needed for anxiety   Refills:  0       COZAAR PO        Dose:  50 mg   Take 50 mg by mouth every morning   Refills:  0       GLIPIZIDE PO        Dose:  10 mg   Take 10 mg by mouth 2 times daily (before meals)   Refills:  0       hydrochlorothiazide 12.5 MG capsule   Commonly known as:  MICROZIDE        Dose:  25 mg   Take 25 mg by mouth daily   Refills:  0       LAMICTAL PO        100 mg in the AM, 200 mg at HS   Refills:  0       LANTUS SOLOSTAR SC        Dose:  40 Units   Inject 40 Units Subcutaneous At Bedtime   Refills:  0       LEVOTHYROXINE SODIUM PO        Dose:  25 mcg   Take 25 mcg by mouth daily   Refills:  0       loperamide 2 MG capsule   Commonly known as:  IMODIUM        Dose:  4 mg   Take 4 mg by mouth daily as needed for diarrhea   Refills:  0       MECLIZINE HCL PO        Dose:  25 mg   Take 25 mg by mouth daily   Refills:  0       metFORMIN 500 MG 24 hr tablet   Commonly known as:  GLUCOPHAGE-XR        Dose:  500 mg   Take 500 mg by mouth  daily (with dinner)   Refills:  0       METOPROLOL TARTRATE PO        Dose:  25 mg   Take 25 mg by mouth 3 times daily   Refills:  0       PAXIL PO        Dose:  40 mg   Take 40 mg by mouth daily   Refills:  0            Where to get your medicines      These medications were sent to [facility] - [place], [place] - [address]  [address] [address]     Phone:  [phone]     acetaminophen 325 MG tablet    senna-docusate 8.6-50 MG per tablet         Some of these will need a paper prescription and others can be bought over the counter. Ask your nurse if you have questions.     Bring a paper prescription for each of these medications     oxyCODONE IR 5 MG tablet                Protect others around you: Learn how to safely use, store and throw away your medicines at www.disposemymeds.org.             Medication List: This is a list of all your medications and when to take them. Check marks below indicate your daily home schedule. Keep this list as a reference.      Medications           Morning Afternoon Evening Bedtime As Needed    acetaminophen 325 MG tablet   Commonly known as:  TYLENOL   Take 2 tablets (650 mg) by mouth every 4 hours as needed for mild pain or fever   Last time this was given:  650 mg on 12/14/2017  6:35 AM                                ASPIRIN PO   Take 81 mg by mouth daily   Last time this was given:  81 mg on 12/14/2017  7:43 AM                                ATIVAN PO   Take 0.5 mg by mouth as needed for anxiety   Last time this was given:  0.5 mg on 12/13/2017 10:32 PM                                COZAAR PO   Take 50 mg by mouth every morning   Last time this was given:  50 mg on 12/14/2017  7:42 AM                                GLIPIZIDE PO   Take 10 mg by mouth 2 times daily (before meals)                                hydrochlorothiazide 12.5 MG capsule   Commonly known as:  MICROZIDE   Take 25 mg by mouth daily                                 LAMICTAL PO   100 mg in the AM, 200 mg at HS                                LANTUS SOLOSTAR SC   Inject 40 Units Subcutaneous At Bedtime   Last time this was given:  40 Units on 12/12/2017 11:25 PM                                LEVOTHYROXINE SODIUM PO   Take 25 mcg by mouth daily   Last time this was given:  25 mcg on 12/14/2017  7:42 AM                                loperamide 2 MG capsule   Commonly known as:  IMODIUM   Take 4 mg by mouth daily as needed for diarrhea                                MECLIZINE HCL PO   Take 25 mg by mouth daily   Last time this was given:  25 mg on 12/14/2017  7:43 AM                                metFORMIN 500 MG 24 hr tablet   Commonly known as:  GLUCOPHAGE-XR   Take 500 mg by mouth daily (with dinner)                                METOPROLOL TARTRATE PO   Take 25 mg by mouth 3 times daily   Last time this was given:  25 mg on 12/14/2017  4:50 PM                                oxyCODONE IR 5 MG tablet   Commonly known as:  ROXICODONE   Take 1-2 tablets (5-10 mg) by mouth every 4 hours as needed for moderate to severe pain   Last time this was given:  10 mg on 12/14/2017  9:54 AM                                PAXIL PO   Take 40 mg by mouth daily                                senna-docusate 8.6-50 MG per tablet   Commonly known as:  SENOKOT-S;PERICOLACE   Take 1-2 tablets by mouth 2 times daily as needed for constipation

## 2017-12-12 NOTE — IP AVS SNAPSHOT
[address] [address]    Phone:  [phone]                                       After Visit Summary   12/12/2017    Ginger Diaz    MRN: [id number]           After Visit Summary Signature Page     I have received my discharge instructions, and my questions have been answered. I have discussed any challenges I see with this plan with the nurse or doctor.    ..........................................................................................................................................  Patient/Patient Representative Signature      ..........................................................................................................................................  Patient Representative Print Name and Relationship to Patient    ..................................................               ................................................  Date                                            Time    ..........................................................................................................................................  Reviewed by Signature/Title    ...................................................              ..............................................  Date                                                            Time

## 2017-12-12 NOTE — BRIEF OP NOTE
GYNECOLOGIC ONCOLOGY BRIEF OPERATION NOTE    PATIENT: Ginger Diaz  MRN: [id number]  DATE OF SURGERY: 12/12/17                   PREOPERATIVE DIAGNOSES:   1. Recurrent endometrial carcinoma  2. S/p EBRT     POSTOPERATIVE DIAGNOSES:   1. Recurrent endometrial carcinoma  2. S/p EBRT     OPERATIVE PROCEDURES:   1. Diagnostic laparoscopy  2. Lysis of adhesions  3. Interstitial needle placement by radiation oncology     SURGEON: Josefa Bae MD      ASSISTANTS:   1. Shira Goncalves MD, 2nd year fellow  2. Radha Gonzalez 2nd year resident     ANESTHESIA: General     ESTIMATED BLOOD LOSS: 20 mL     TOTAL INTRAVENOUS FLUIDS: crystalloid 900 mL      TOTAL URINE OUTPUT: 100 mL, clear urine     TRANSFUSIONS: none     DRAIN: henriquez cathether      SPECIMENS REMOVED: none     OPERATIVE FINDINGS:   On exam under anesthesia, distal vaginal cuff involved with indurated tumor. Vagina smooth. On laparoscopy, liver and diaphragm appeared normal. There was a small omental adhesion in the right upper quadrant that was not removed. In the pelvis, the sigmoid colon was densely adherent to the bladder and vaginal cuff on the left. An attempt at dissecting the colon away from the cuff proved unsuccessful, with suspicion that the tumor is invading in to colon. Interstitial needles were placed by radiation oncology. On proctoscopy, the colon was distended with air and no bubbles were visualized with laparoscopy.      COMPLICATIONS: None noted.      CONDITION: Stable on transfer.     Shira Goncalves MD  Gynecologic Oncology Fellow

## 2017-12-12 NOTE — ANESTHESIA POSTPROCEDURE EVALUATION
Patient: Ginger Diaz    Procedure(s):  Laparoscopic Insertion Interstitial Needle with Ultrasound Guidance, lysis of adhesions - Wound Class: II-Clean Contaminated   - Wound Class: II-Clean Contaminated    Diagnosis:Endometrial Cancer   Diagnosis Additional Information: No value filed.    Anesthesia Type:  General, ETT    Note:  Anesthesia Post Evaluation    Patient location during evaluation: bedside  Patient participation: Able to fully participate in evaluation  Level of consciousness: awake  Pain management: adequate  Airway patency: patent  Cardiovascular status: acceptable  Respiratory status: acceptable  Hydration status: acceptable  PONV: controlled     Anesthetic complications: None          Last vitals:  Vitals:    12/12/17 1100 12/12/17 1115 12/12/17 1130   BP: 179/74 166/65 170/79   Pulse:      Resp: 16 14 14   Temp:      SpO2: 98% 96% 99%         Electronically Signed By: Yosvany Clarke MD, MD  December 12, 2017  11:38 AM

## 2017-12-12 NOTE — DISCHARGE SUMMARY
Gynecologic Oncology Discharge Summary    Ginger Diaz  [id number]    Admit Date: 12/12/2017  Discharge Date: 12/14/2017  Admitting Provider: Dr. Bae  Discharge Provider: Dr. Aaron Gutierrez    Admission Dx:   - Recurrent stage IB grade 1 endometrioid adenocarcinoma  - S/p external beam radiation therapy  - DM II  - HTN  - Asthma  - Hypothyroidism  Discharge Dx:  - s/p interstitial needle placement for brachytherapy and subsequent removal  - Recurrent stage IB grade 1 endometrioid adenocarcinoma  - DM II  - HTN  - Asthma  - Hypothyroidism    Patient Active Problem List   Diagnosis     Recurrent carcinoma of endometrium (H)     Endometrial cancer (H)     Procedures:   - Laparoscopic and ultrasound guided placement of interstitial needles and removal  - Brachytherapy  - Epidural     Prior to Admission Medications:  Prescriptions Prior to Admission   Medication Sig Dispense Refill Last Dose     MECLIZINE HCL PO Take 25 mg by mouth daily    12/12/2017 at AM     loperamide (IMODIUM) 2 MG capsule Take 4 mg by mouth daily as needed for diarrhea   Past Week at Unknown time     LEVOTHYROXINE SODIUM PO Take 25 mcg by mouth daily   12/12/2017 at AM     Losartan Potassium (COZAAR PO) Take 50 mg by mouth every morning    12/11/2017 at AM     Insulin Glargine (LANTUS SOLOSTAR SC) Inject 40 Units Subcutaneous At Bedtime    12/11/2017 at 2200     LORazepam (ATIVAN PO) Take 0.5 mg by mouth as needed for anxiety   12/12/2017 at AM     METOPROLOL TARTRATE PO Take 25 mg by mouth 3 times daily    12/12/2017 at AM     GLIPIZIDE PO Take 10 mg by mouth 2 times daily (before meals)   More than a month at Unknown time     ASPIRIN PO Take 81 mg by mouth daily    More than a month at Unknown time     hydrochlorothiazide (MICROZIDE) 12.5 MG capsule Take 25 mg by mouth daily   More than a month at Unknown time     LamoTRIgine (LAMICTAL PO) 100 mg in the AM, 200 mg at HS    More than a month at Unknown time     metFORMIN (GLUCOPHAGE-XR) 500  MG 24 hr tablet Take 500 mg by mouth daily (with dinner)    More than a month at Unknown time     PARoxetine HCl (PAXIL PO) Take 40 mg by mouth daily   More than a month at Unknown time     Discharge Medications:   Ginger Diaz   Home Medication Instructions KAYLYN:[id number]    Printed on:12/14/17 1130   Medication Information                      acetaminophen (TYLENOL) 325 MG tablet  Take 2 tablets (650 mg) by mouth every 4 hours as needed for mild pain or fever             ASPIRIN PO  Take 81 mg by mouth daily              GLIPIZIDE PO  Take 10 mg by mouth 2 times daily (before meals)             hydrochlorothiazide (MICROZIDE) 12.5 MG capsule  Take 25 mg by mouth daily             Insulin Glargine (LANTUS SOLOSTAR SC)  Inject 40 Units Subcutaneous At Bedtime              LamoTRIgine (LAMICTAL PO)  100 mg in the AM, 200 mg at HS              LEVOTHYROXINE SODIUM PO  Take 25 mcg by mouth daily             loperamide (IMODIUM) 2 MG capsule  Take 4 mg by mouth daily as needed for diarrhea             LORazepam (ATIVAN PO)  Take 0.5 mg by mouth as needed for anxiety             Losartan Potassium (COZAAR PO)  Take 50 mg by mouth every morning              MECLIZINE HCL PO  Take 25 mg by mouth daily              metFORMIN (GLUCOPHAGE-XR) 500 MG 24 hr tablet  Take 500 mg by mouth daily (with dinner)              METOPROLOL TARTRATE PO  Take 25 mg by mouth 3 times daily              oxyCODONE IR (ROXICODONE) 5 MG tablet  Take 1-2 tablets (5-10 mg) by mouth every 4 hours as needed for moderate to severe pain             PARoxetine HCl (PAXIL PO)  Take 40 mg by mouth daily                 Consultations:  Radiation oncology    Brief History of Illness:  Ginger Diaz is a 67 year old with recurrent stage IB grade 1 endometrioid adenocarcinoma who is admitted for interstitial needle placement and vaginal cuff brachytherapy. S/p RA TLH-BSO, pelvic lymph node dissection in 2011. Pt declined recommended adjuvant  radiation. In September 2017 she re-presented with vaginal bleeding and was found to have a vaginal cuff mass which was biopsied and returned as recurrent endometrial cancer. She underwent EBRT. Now admitted for brachytherapy.    Hospital Course:  Dz:   - Recurrent stage IB grade 1 endometrioid adenocarcinoma. S/p RA TLH-BSO, pelvic LND (2011)> pt declined recommended adjuvant radiation> vaginal bleeding, cuff recurrence (9/2017)> s/p EBRT. Admitted for brachytherapy.  FEN:   - She was made NPO prior to procedures, but maintained a regular diet following them. She tolerated a regular diet without complication until discharge.   Pain:   - Her pain was controlled with epidural, tylenol, oxycodone and IV dilaudid prn. Epidural was removed with removal of interstitial needles. On discharge her pain was adequately controlled with tylenol and oxycodone. She was discharged home with these medications.  CV:   - She has a history of hypertension for which her home losartan and metoprolol were continued. Her vital signs were stable while in house and she had no acute CV issues.  PULM:   - She has a history of asthma. Albuterol was ordered prn. She was initially given O2 supplementation in to maintain her O2 sats in the immediate postop period and was transitioned off of this without difficulty.  By discharge, her O2 sats were greater than 94% on RA.  She was encouraged to use her bedside IS while in house.  She had no acute pulmonary issues while in house.  HEME:   - Her preoperative Hgb was 11.7. Surgical EBL was 20 mL. Hgb was not check postoperatively, patient was asymptomatic.  She had no other acute heme issues while in house.  GI:   - She was made NPO prior to the procedures. Regular diet was resumed following both placement and removal of needles. At the time of discharge, she was tolerating a regular diet without nausea and vomiting. She will be discharged with a bowel regimen to prevent constipation in the  postoperative period.  She had no acute GI issues while in house.  :    - A henriquez catheter was placed at the time of the surgery and kept in place until interstitial needle removal on POD#2. Prior to discharge, the patient was voiding spontaneously without difficulty.  She had no acute  issues while in house.  ID:   - The patient was AF during her hospitalization.  She received standard preoperative antibiotics without incident.    ENDO:   - Patient has a history of type 2 DM, home metformin and glipizide were held. She was continued on 40 units of lantus HS and was put on insulin SS. She was continued on levothyroxine for hypothyroidism.  PSYCH/NEURO:   - Her lamotrigine and paroxetine for h/o bipolar disorder were held. Ativan was added nightly for anxiety. Patient experienced sensory deficit in RLE, likely secondary to epidural placement, which resolved without complication.  PPX:    - She was given SCDs, IS, and lovenox during her hospital course.  She tolerated these prophylactic interventions without incident.  They were discontinued at the time of her discharge.      Discharge Instructions and Follow up:  Ms. Ginger Diaz was discharged from the hospital in good condition.    Discharge Diet: Regular  Discharge Activity: Activity as tolerated  Discharge Follow up: [facility] within 4 weeks    Discharge Disposition:  Discharged to home    Discharge Staff: Dr Aaron Ruelas  7:45 PM December 14, 2017     Talia Walker MD  Gynecologic Oncology  [facility]

## 2017-12-12 NOTE — PROGRESS NOTES
"Post-Op Check      Ginger Diaz is a 67 year old POD#0 s/p diagnostic laparoscopy, lysis of adhesions, and interstitial needle placement.    S: Pt doing well with pain well controlled with dilaudid, oxycodone and epidural. Denies shortness of breath or chest pain. Experiencing nausea and dizziness similar to what she experiences with Meniere's. Reviewed operative findings. She became a bit tearful and overwhelmed with her current circumstances. Also notes diminished sensation of right left below the knee. Able to move the leg.    O:    /72 (BP Location: Left arm)  Pulse 62  Temp 98  F (36.7  C) (Oral)  Resp 16  Ht 1.727 m (5' 8\")  Wt 109 kg (240 lb 4.8 oz)  SpO2 95%  BMI 36.54 kg/m2    I/O last 3 completed shifts:  In: 900 [I.V.:900]  Out: 175 [Urine:155; Blood:20]    General:  A&Ox3, NAD  CV:  RRR, no m/r/g  Pulm:  CTAB, good inspiratory effort  Neuro: diminished light touch sensation in RLE (knee down), LLE light touch sensation intact, 5/5 strength in bilateral LE  Abd: soft, appropriately tender to palpation, nondistended.  No rebound or guarding  : interstitial needles in place, no bleeding noted  Incisions: c/d/i with overlying dermabond  LE: 1+ pitting edema, no calf tenderness    Labs:  Preop Hgb 11.7    A:  67 year old F, POD#0 s/p diagnostic laparoscopy, lysis of adhesions, and interstitial needle placement. Overall, pt is doing well. Diminished sensation in RLE likely secondary to either epidural or possible sciatic nerve impingement from malpositioning in surgery.    P:  Dz: Recurrent endometrial carcinoma s/p EBRT. Admitted for brachytherapy. Will receive 1/5 courses of brachytherapy today.  FEN: regular diet  Pain: tylenol, IV dilaudid PRN, oxycodone PRN, epidural @ 8mL/hr  Heme: Hgb 11.7 > EBL 20mL   CV: h/o hypertension, metoprolol and losartan ord'd.  Pulm: Asthma, albuterol prn  Renal: NI  GI: full diet. Zofran PRN for nausea.  : De Jesus in place.  ID: Afebrile  Endo: h/o DM2, " 40 units lantus HS ord'd. H/o hypothyroidism, restart synthroid in AM  Psych/Neuro: h/o Meniere's disease, zofran, dramamine, and meclizine BID.  PPX: SCDs, IS, lovenox ord'd for tomorrow  Dispo: Discharge home following completion of brachytherapy    Rajiv Gonzalez MD  OB/GYN Resident, PGY-2  12/12/2017 3:35 PM

## 2017-12-13 ENCOUNTER — ALLIED HEALTH/NURSE VISIT (OUTPATIENT)
Dept: RADIATION ONCOLOGY | Facility: CLINIC | Age: 67
DRG: 745 | End: 2017-12-13
Attending: RADIOLOGY
Payer: COMMERCIAL

## 2017-12-13 DIAGNOSIS — C54.1 RECURRENT CARCINOMA OF ENDOMETRIUM (H): Primary | ICD-10-CM

## 2017-12-13 DIAGNOSIS — C54.1 ENDOMETRIAL CANCER (H): Primary | ICD-10-CM

## 2017-12-13 LAB
GLUCOSE BLDC GLUCOMTR-MCNC: 102 MG/DL (ref 70–99)
GLUCOSE BLDC GLUCOMTR-MCNC: 121 MG/DL (ref 70–99)
GLUCOSE BLDC GLUCOMTR-MCNC: 128 MG/DL (ref 70–99)
GLUCOSE BLDC GLUCOMTR-MCNC: 147 MG/DL (ref 70–99)
GLUCOSE BLDC GLUCOMTR-MCNC: 86 MG/DL (ref 70–99)

## 2017-12-13 PROCEDURE — C1717 BRACHYTX, NON-STR,HDR IR-192: HCPCS | Performed by: RADIOLOGY

## 2017-12-13 PROCEDURE — 25000132 ZZH RX MED GY IP 250 OP 250 PS 637: Performed by: NURSE PRACTITIONER

## 2017-12-13 PROCEDURE — 99024 POSTOP FOLLOW-UP VISIT: CPT | Mod: GC | Performed by: OBSTETRICS & GYNECOLOGY

## 2017-12-13 PROCEDURE — 77772 HDR RDNCL NTRSTL/ICAV BRCHTX: CPT | Performed by: RADIOLOGY

## 2017-12-13 PROCEDURE — 25000132 ZZH RX MED GY IP 250 OP 250 PS 637: Performed by: OBSTETRICS & GYNECOLOGY

## 2017-12-13 PROCEDURE — 25000128 H RX IP 250 OP 636: Performed by: NURSE PRACTITIONER

## 2017-12-13 PROCEDURE — 25000131 ZZH RX MED GY IP 250 OP 636 PS 637: Performed by: NURSE PRACTITIONER

## 2017-12-13 PROCEDURE — 82962 GLUCOSE BLOOD TEST: CPT

## 2017-12-13 PROCEDURE — 82962 GLUCOSE BLOOD TEST: CPT | Mod: 91

## 2017-12-13 PROCEDURE — 77772 HDR RDNCL NTRSTL/ICAV BRCHTX: CPT | Mod: XE | Performed by: RADIOLOGY

## 2017-12-13 PROCEDURE — 25000131 ZZH RX MED GY IP 250 OP 636 PS 637: Performed by: STUDENT IN AN ORGANIZED HEALTH CARE EDUCATION/TRAINING PROGRAM

## 2017-12-13 PROCEDURE — 25000128 H RX IP 250 OP 636: Performed by: STUDENT IN AN ORGANIZED HEALTH CARE EDUCATION/TRAINING PROGRAM

## 2017-12-13 PROCEDURE — 25000125 ZZHC RX 250: Performed by: STUDENT IN AN ORGANIZED HEALTH CARE EDUCATION/TRAINING PROGRAM

## 2017-12-13 PROCEDURE — 25000132 ZZH RX MED GY IP 250 OP 250 PS 637: Performed by: STUDENT IN AN ORGANIZED HEALTH CARE EDUCATION/TRAINING PROGRAM

## 2017-12-13 PROCEDURE — 12000008 ZZH R&B INTERMEDIATE UMMC

## 2017-12-13 PROCEDURE — 77280 THER RAD SIMULAJ FIELD SMPL: CPT | Performed by: RADIOLOGY

## 2017-12-13 RX ORDER — LORAZEPAM 0.5 MG/1
0.5 TABLET ORAL
Status: DISCONTINUED | OUTPATIENT
Start: 2017-12-13 | End: 2017-12-14 | Stop reason: HOSPADM

## 2017-12-13 RX ADMIN — BUPIVACAINE HYDROCHLORIDE: 7.5 INJECTION, SOLUTION EPIDURAL; RETROBULBAR at 16:01

## 2017-12-13 RX ADMIN — LEVOTHYROXINE SODIUM 25 MCG: 25 TABLET ORAL at 09:08

## 2017-12-13 RX ADMIN — OXYCODONE HYDROCHLORIDE 10 MG: 5 TABLET ORAL at 06:47

## 2017-12-13 RX ADMIN — ACETAMINOPHEN 650 MG: 325 TABLET, FILM COATED ORAL at 02:31

## 2017-12-13 RX ADMIN — ACETAMINOPHEN 650 MG: 325 TABLET, FILM COATED ORAL at 20:10

## 2017-12-13 RX ADMIN — OXYCODONE HYDROCHLORIDE 10 MG: 5 TABLET ORAL at 22:32

## 2017-12-13 RX ADMIN — ACETAMINOPHEN 650 MG: 325 TABLET, FILM COATED ORAL at 16:12

## 2017-12-13 RX ADMIN — OXYCODONE HYDROCHLORIDE 5 MG: 5 TABLET ORAL at 14:04

## 2017-12-13 RX ADMIN — LOSARTAN POTASSIUM 50 MG: 50 TABLET ORAL at 09:08

## 2017-12-13 RX ADMIN — MECLIZINE 25 MG: 25 TABLET ORAL at 09:08

## 2017-12-13 RX ADMIN — METOPROLOL TARTRATE 25 MG: 25 TABLET ORAL at 14:00

## 2017-12-13 RX ADMIN — OXYCODONE HYDROCHLORIDE 5 MG: 5 TABLET ORAL at 12:18

## 2017-12-13 RX ADMIN — ACETAMINOPHEN 650 MG: 325 TABLET, FILM COATED ORAL at 12:18

## 2017-12-13 RX ADMIN — METOPROLOL TARTRATE 25 MG: 25 TABLET ORAL at 20:10

## 2017-12-13 RX ADMIN — DIMENHYDRINATE 50 MG: 50 TABLET ORAL at 09:09

## 2017-12-13 RX ADMIN — METOPROLOL TARTRATE 25 MG: 25 TABLET ORAL at 09:08

## 2017-12-13 RX ADMIN — LORAZEPAM 0.5 MG: 0.5 TABLET ORAL at 22:32

## 2017-12-13 RX ADMIN — OXYCODONE HYDROCHLORIDE 10 MG: 5 TABLET ORAL at 02:31

## 2017-12-13 RX ADMIN — ENOXAPARIN SODIUM 40 MG: 40 INJECTION SUBCUTANEOUS at 10:43

## 2017-12-13 RX ADMIN — OXYCODONE HYDROCHLORIDE 10 MG: 5 TABLET ORAL at 18:06

## 2017-12-13 ASSESSMENT — PAIN DESCRIPTION - DESCRIPTORS
DESCRIPTORS: SORE
DESCRIPTORS: ACHING
DESCRIPTORS: ACHING;SORE

## 2017-12-13 NOTE — MR AVS SNAPSHOT
After Visit Summary   12/13/2017    Ginger Diaz    MRN: [id number]           Patient Information     Date Of Birth          1950        Visit Information        Provider Department      12/13/2017 9:00 AM Ema Valera MD Radiation Oncology Clinic        Today's Diagnoses     Endometrial cancer (H)    -  1       Follow-ups after your visit        Your next 10 appointments already scheduled     Dec 14, 2017  8:00 AM CST   TCT/SIM Suite Visit with Adryan Zambrano MD   Radiation Oncology Clinic ([facility])    [facility]  1st Floor  [address] [id number] [phone]            Dec 14, 2017  2:30 PM CST   TCT/SIM Suite Visit with Adryan Zambrano MD   Radiation Oncology Clinic ([facility])    [facility]  1st Floor  [address] [id number] [phone]            Dec 14, 2017   Procedure with Adryan Zambrano MD   [facility] [place], Same Day Surgery (--)    [address] [address] [phone]              Who to contact     Please call your clinic at [phone] to:    Ask questions about your health    Make or cancel appointments    Discuss your medicines    Learn about your test results    Speak to your doctor   If you have compliments or concerns about an experience at your clinic, or if you wish to file a complaint, please contact [facility] Patient Relations at [phone] or email us at [email or web address]         Additional Information About Your Visit        MyChart Information     [employer] is an electronic gateway that provides easy, online access to your medical records. With [employer], you can request a clinic appointment, read your test results, renew a prescription or communicate with your care team.     To sign up for [employer] visit the website at [email or web address]   You will be asked to enter the access  code listed below, as well as some personal information. Please follow the directions to create your username and password.     Your access code is: [email or web address]  Expires: 2018  9:36 AM     Your access code will  in 90 days. If you need help or a new code, please contact your [facility] or call [phone] for assistance.        Care EveryWhere ID     This is your Care EveryWhere ID. This could be used by other organizations to access your [facility] medical records  [id number]         Blood Pressure from Last 3 Encounters:   17 139/57   17 189/64   17 143/78    Weight from Last 3 Encounters:   17 109 kg (240 lb 4.8 oz)   17 110.5 kg (243 lb 9.6 oz)   17 110.5 kg (243 lb 8 oz)              Today, you had the following     No orders found for display         Today's Medication Changes      Notice     This visit is during an admission. Changes to the med list made in this visit will be reflected in the After Visit Summary of the admission.             Primary Care Provider Office Phone # Fax #    Casey Sasha [phone]       [facility] [address] [place] [address]        Equal Access to Services     LEOLA LINARES : Hadii tricia vides hadasho Soomaali, waaxda luqadaha, qaybta kaalmada adeegyada, josé luis rosas . So [facility] [phone].    ATENCIÓN: Si habla español, tiene a jenkins disposición servicios gratuitos de asistencia lingüística. Harisame al [phone].    We comply with applicable federal civil rights laws and [place] laws. We do not discriminate on the basis of race, color, national origin, age, disability, sex, sexual orientation, or gender identity.            Thank you!     Thank you for choosing [facility]COL[facility]Y CL[facility]  for your care. Our goal is always to provide you with excellent care. Hearing back from our patients is one way we can continue to improve our services.  Please take a few minutes to complete the written survey that you may receive in the mail after your visit with us. Thank you!             Your Updated Medication List - Protect others around you: Learn how to safely use, store and throw away your medicines at www.disposemymeds.org.      Notice     This visit is during an admission. Changes to the med list made in this visit will be reflected in the After Visit Summary of the admission.

## 2017-12-13 NOTE — ANESTHESIA POST-OP FOLLOW-UP NOTE
REGIONAL ANESTHESIA PAIN SERVICE EPIDURAL NOTE  SUBJECTIVE:   Interval History: Pt reports epidural rate had to be reduced yesterday postop due to BLE weakness, but then had more pain that has improved with analgesics as ordered..  Continues to have BLE numbness and weakness- able to bend knees but unable to straight leg raise, denies paresthesias, circumoral numbness, metallic taste or tinnitus.  Pt on bedrest.  Patient is currently denies;  tolerating diet.                                 Numerical Rating Scale:    Reports pain 5/10 at rest and 5/10 with movement.             Anticoagulation:  none     Medications related to Pain Management (Future)    Start       Dose/Rate Route Frequency Ordered Stop     12/14/17 0800   aspirin chewable tablet 81 mg       81 mg Oral DAILY 12/12/17 1504        12/12/17 1515   bupivacaine (MARCAINE) 0.125 % in NaCl 0.9 % 250 mL EPIDURAL Infusion       6 mL/hr  EPIDURAL CONTINUOUS 12/12/17 1504        12/12/17 1504   oxyCODONE IR (ROXICODONE) tablet 5-10 mg       5-10 mg Oral EVERY 4 HOURS PRN 12/12/17 1504        12/12/17 1504   HYDROmorphone (DILAUDID) injection 0.2-0.3 mg       0.2-0.3 mg Intravenous EVERY 2 HOURS PRN 12/12/17 1504        12/12/17 1504   acetaminophen (TYLENOL) tablet 650 mg       650 mg Oral EVERY 4 HOURS PRN 12/12/17 1504        12/12/17 0803   nalbuphine (NUBAIN) injection 2.5-5 mg       2.5-5 mg Intravenous EVERY 6 HOURS PRN 12/12/17 0803                  OBJECTIVE:  Diagnostics:  Lab Results   Component Value Date     WBC 5.3 12/12/2017            Lab Results   Component Value Date     RBC 3.80 12/12/2017            Lab Results   Component Value Date     HGB 11.7 12/12/2017            Lab Results   Component Value Date     HCT 34.8 12/12/2017            Lab Results   Component Value Date      12/12/2017         No results found for: INR     Vitals:                        Temp:  [95.1  F (35.1  C)-98.2  F (36.8  C)] 97.6  F (36.4  C)  Pulse:  [64-73]  68  Heart Rate:  [59-73] 70  Resp:  [14-20] 18  BP: (144-196)/(58-87) 144/58  SpO2:  [94 %-99 %] 98 %     Exam:                         Strength 1/5 and symmetric grossly in bilateral LE, able to dorsi/plantar flex, bend knees, abduct and adduct, but unble to straight leg raise bilateral LE                        Epidural site with dressing c/d/i, no tenderness, erythema, heme, edema        ASSESSMENT/PLAN:    Ginger Diaz is a 67 year old female POD #1 s/p INSERT INTERSTITIAL NEEDLE, ULTRASOUND GUIDED  LAPAROSCOPY OPERATIVE ADULT and placement of L4-5 epidural for analgesia.  Pt receiving adequate analgesia with epidural infusion Bbupivacaine 0.125% at 6mL/hour and analgesics as ordered.  Weakness affecting mobility, denies paresthesias, adequate sensory block.  No evidence of adverse side effects related to local anesthetic.  On bedrest     L4-5 epidural catheter placed 12/12/17 for postop pain management, catheter day #1  - continue current epidural infusion at 6mL/hour  - will continue to follow and adjust as needed     Anticoagulation. Contact [facility] prior to ordering         Ritesh Garcia MD  Perioperative and Interventional Pain Service  12/13/2017 5:49 PM  [facility]IPS 24-hour Job Code Pagers (for in-house use only, may text page using [employer])  [place]:  [phone]  [place]:  [phone]  Peds [facility]: [phone]

## 2017-12-13 NOTE — PROGRESS NOTES
GYN ONC PROGRESS NOTE    POD#1 s/p LSC guided insertion interstitial needles for vaginal cuff brachytherapy  Dz: recurrent stage IB grade 1 endometrioid adenocarcinoma    24 hour events: ---- No acute events.    Subjective: --- Patient reports having a very hard time being cooped up in room, not being able to move. She required ativan, which she normally takes at night. States she will need good pain and anxiety control if she has to stay another night.  Pain overall well controlled, tolerating diet. Does have numbness more in her right leg than left from epidural.     Objective:   Temp: 97.9  F (36.6  C) Temp  Min: 95.1  F (35.1  C)  Max: 98.2  F (36.8  C)  Resp: 17 Resp  Min: 11  Max: 20  SpO2: 98 % SpO2  Min: 94 %  Max: 100 %  Pulse: 64 Pulse  Min: 62  Max: 73  Heart Rate: 70 Heart Rate  Min: 59  Max: 73  BP: 154/59 Systolic (24hrs), Av , Min:131 , Max:196   Diastolic (24hrs), Av, Min:59, Max:121    I/O last 3 completed shifts:  In: 1156.67 [P.O.:150; I.V.:1006.67]  Out: 525 [Urine:505; Blood:20]    General:  A&Ox3, NAD  CV:  RRR, no m/r/g  Pulm:  CTAB, good inspiratory effort  Neuro: diminished light touch sensation in RLE (knee down), LLE light touch sensation intact, 5/5 strength in bilateral LE  Abd: soft, appropriately tender to palpation, nondistended.  No rebound or guarding.  : interstitial needles in place, no bleeding noted  Incisions: c/d/i with overlying dermabond  LE: 1+ pitting edema, no calf tenderness    New labs/imaging-  Cr 0.63    Assessment: 67 year old POD#1 s/p LSC guided insertion interstitial needles for vaginal cuff brachytherapy    Dz: recurrent stage IB grade 1 endometrioid adenocarcinoma. S/p RA TLH-BSO, pelvic LND ()> pt declined recommended adjuvant radiation> VB, cuff recurrence (2017)> s/p EBRT. Admitted for brachytherapy.  FEN: regular diet, encourage po fluid intake   Pain: epidural, tylenol, oxy, IV dilaudid prn  Heme: Hgb  11.7> EBL 20 mL  CV: HTN; losartan,  metoprolol  Pulm: asthma; albuterol prn  GI: regular diet  : henriquez in place, watch UOP  ID: NI  Endo: T2DM; 40U lantus HS, med SSI; metformin & glipizide held; Hypothyroidism; levothyroxine  Psych/Neuro: Bipolar d/o - lamotrigine and paroxetine held; meniere's - meclizine, dramamine, and zofran prn; anxiety - ativan ordered for at night  MSK: POD #1 asymmetric epidural, anesthesia to see  PPX: SCD, IS, lovenox ord'd for 12/13  Dispo: dc after brachytherapy likely 12/14 or 12/15  Drains/Lines: PIV, interstitial needles, henriquez, epidural    Navid Ruelas MD, PGY4  December 13, 2017 4:40 AM     Provider Disclosure:   I agree with above History, Review of Systems, Physical exam and Plan. I have reviewed the content of the documentation and have edited it as needed. I have personally performed the services documented here and the documentation accurately represents those services and the decisions I have made.     Electronically signed by:   Josefa Bae MD   Gynecologic Oncology   [facility]

## 2017-12-13 NOTE — PROGRESS NOTES
A radiation therapy treatment planning simulation was performed.  HDR brachytherapy Gunner interstitial fraction 3 of 5 was delivered.  Please see the [employer] record for documentation.    Ema Valera MD  Radiation Oncology

## 2017-12-13 NOTE — PLAN OF CARE
Problem: Patient Care Overview  Goal: Plan of Care/Patient Progress Review  Outcome: No Change  Patient has interstitial needles in place. Zofran given x 1 for intermittent nausea that resolved. Tolerated diet.  covered c SS. De Jesus c adequate urine output. Lap sites c/d/i. Epidural 6 ml/hr. Right foot numbness. Anesthesia pain team aware and will see patient. Abdominal pain treated c Oxycodone c some relief. Ativan 0.5 mg po given for anxiety. Hypertensive MD aware. Extra Metoprolol po given. Rechecking BP. Continue to monitor. Patient refused CAPNO. NC 2 L 02. Pericares done. Daughter staying at bedside. Continue c POC.

## 2017-12-13 NOTE — MR AVS SNAPSHOT
After Visit Summary   12/13/2017    Ginger Diaz    MRN: [id number]           Patient Information     Date Of Birth          1950        Visit Information        Provider Department      12/13/2017 3:00 PM Ema Valera MD Radiation Oncology [facility]        Today's Diagnoses     Recurrent carcinoma of endometrium (H)    -  1       Follow-ups after your visit        Your next 10 appointments already scheduled     Dec 14, 2017  8:00 AM CST   TCT/SIM Suite Visit with Adryan Zambrano MD   Radiation Oncology Clinic ([facility])    [facility]  1st Floor  [address] [id number] [phone]            Dec 14, 2017  2:30 PM CST   TCT/SIM Suite Visit with Adryan Zambrano MD   Radiation Oncology Clinic ([facility])    [facility]  1st Floor  [address] [id number] [phone]            Dec 14, 2017   Procedure with Adryan Zambrano MD   [facility], [place], Same Day Surgery (--)    [address] [address] [phone]              Who to contact     Please call your clinic at [phone] to:    Ask questions about your health    Make or cancel appointments    Discuss your medicines    Learn about your test results    Speak to your doctor   If you have compliments or concerns about an experience at your clinic, or if you wish to file a complaint, please contact [facility] Patient Relations at [phone] or email us at [email or web address]         Additional Information About Your Visit        MyChart Information     Ushahidi is an electronic gateway that provides easy, online access to your medical records. With Ushahidi, you can request a clinic appointment, read your test results, renew a prescription or communicate with your care team.     To sign up for Welltokt visit the website at [email or web address]   You will be asked to  enter the access code listed below, as well as some personal information. Please follow the directions to create your username and password.     Your access code is: [email or web address]  Expires: 2018  9:36 AM     Your access code will  in 90 days. If you need help or a new code, please contact your [facility] or call [phone] for assistance.        Care EveryWhere ID     This is your Care EveryWhere ID. This could be used by other organizations to access your [facility] medical records  [id number]         Blood Pressure from Last 3 Encounters:   17 139/57   17 189/64   17 143/78    Weight from Last 3 Encounters:   17 109 kg (240 lb 4.8 oz)   17 110.5 kg (243 lb 9.6 oz)   17 110.5 kg (243 lb 8 oz)              Today, you had the following     No orders found for display         Today's Medication Changes      Notice     This visit is during an admission. Changes to the med list made in this visit will be reflected in the After Visit Summary of the admission.             Primary Care Provider Office Phone # Fax #    Casey Sasha [phone]       [facility] [address] [place] [address]        Equal Access to Services     LEOLA LINARES : Hadii tricia vides hadasho Sophilipp, waaxda luqadaha, qaybta kaalmada adeegyada, josé luis rosas . So [facility] [phone].    ATENCIÓN: Si habla español, tiene a jenkins disposición servicios gratuitos de asistencia lingüística. Llame al [phone].    We comply with applicable federal civil rights laws and [place] laws. We do not discriminate on the basis of race, color, national origin, age, disability, sex, sexual orientation, or gender identity.            Thank you!     Thank you for choosing [facility]  for your care. Our goal is always to provide you with excellent care. Hearing back from our patients is one way we can continue to  improve our services. Please take a few minutes to complete the written survey that you may receive in the mail after your visit with us. Thank you!             Your Updated Medication List - Protect others around you: Learn how to safely use, store and throw away your medicines at www.disposemymeds.org.      Notice     This visit is during an admission. Changes to the med list made in this visit will be reflected in the After Visit Summary of the admission.

## 2017-12-13 NOTE — PROGRESS NOTES
A radiation therapy treatment planning simulation was performed.  HDR brachytherapy Gunner interstitial fraction 2 of 5 was delivered.  Please see the [employer] record for documentation.    Ema Valera MD  Radiation Oncology

## 2017-12-13 NOTE — PLAN OF CARE
Problem: Patient Care Overview  Goal: Plan of Care/Patient Progress Review  Outcome: No Change  Epidural Bupivacaine, Tylenol and Oxycodone for pain. Denied LAST symptoms. Interstitial needles in place. Abdominal lap sites CDI. Bowel sounds hypoactive. De Jesus with low urine output. Patient encouraged to increase oral intake since she her IV fluids are at TKO. Denied nausea. On a moderate CHO diet. Continue with plan of care - continue bedrest, radiation therapy today. Patient would like bedside report.

## 2017-12-13 NOTE — OP NOTE
GYNECOLOGIC ONCOLOGY OPERATION NOTE     PATIENT: Ginger Diaz  MRN: [id number]  DATE OF SURGERY: December 12, 2017     PREOPERATIVE DIAGNOSES:    1. Recurrent endometrial carcinoma  2. Status post external beam radiation therapy     POSTOPERATIVE DIAGNOSES:    1. Recurrent endometrial carcinoma  2. Status post external beam radiation therapy     PROCEDURES:    1. Diagnostic laparoscopy  2. Lysis of adhesions  3. Interstitial needle placement by radiation oncology     SURGEON: Josefa Bae MD      ASSISTANTS:    1. Shira Goncalves MD, 2nd year fellow  2. Radha Gonzalez MD, 2nd year resident     ANESTHESIA: General endotracheal.      ESTIMATED BLOOD LOSS: 20 ml      IV FLUIDS: 900 ml crystalloid     TOTAL URINE OUTPUT: 100 ml, clear     DRAINS: None      FINDINGS: On exam under anesthesia, distal vaginal cuff involved with indurated tumor. Vagina smooth. On laparoscopy, liver and diaphragm appeared normal. There was a small omental adhesion in the right upper quadrant that was not removed. In the pelvis, the sigmoid colon was densely adherent to the bladder and vaginal cuff on the left. An attempt at dissecting the colon away from the cuff proved unsuccessful, with suspicion that the tumor is invading in to colon. Interstitial needles were placed by radiation oncology. On proctoscopy, the colon was distended with air and no bubbles were visualized with laparoscopy.      SPECIMENS: None     COMPLICATIONS: None     CONDITION: Stable to PACU.      INDICATIONS: Ms. 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 She now has recurrent endometrial carcinoma at the cuff and likely involving the sigmoid colon who underwent EBRT at OSH.  She was referred to the Radiation Oncology clinic for concern for interstitial needle placement.     OPERATIVE PROCEDURE IN DETAIL: Consent was reviewed with the patient in the preoperative  setting and confirmed. The patient was transferred to the operating room and placed in dorsal supine position. General anesthetic was obtained in the usual manner without noted difficulties. The patient was then positioned onto Marc stirrups and an exam under anesthesia was performed. De Jesus catheter was then placed under sterile techniques and the patient was prepped and draped for the above-mentioned procedure. Timeout was called at which point the patient's name, procedure and operative site was confirmed by the operative team.      Attention was turned to the upper abdomen. Initially, a 2 cm incision was made 4 cm superior to the umbilicus. Dissection was carried down to fascia, which was grasped with 2 Kochers and incised. A stitch was placed on each side using 0-vicryl on UR needle. The peritoneum was then entered without difficulty and 10 mm trochar inserted into the abdomen. A pneumoperitoneum was established. The laparoscope was assembled and inserted into the abdominal cavity. Survey of the abdomen and pelvis was performed with the above noted findings. Under direct visualization, a right lower quadrant 5 mm port and 5 mm left lower quadrant port were placed under direct visualization with laparoscope. The patient was placed in Trendelenburg.    The procedure was initiated by taking down the filmy adhesions from the cecum and sigmoid colon along the pelvic sidewalls. There was also some filmy adhesions from the epiploica to the pelvic sidewall that were lysed. We then approached the rectosigmoid colon, which was densely adherent to the vaginal cuff. The right vaginal cuff was free. However, the left vaginal cuff was adherent to the sigmoid colon. We were able to get behind the adhesion and see the posterior cul de sac was actually free. We placed a 4th port site, just to the left of the umbilicus. We then spent ~ 45 min attempting to take down the sigmoid from the cuff, but got to a point where we believed  we were cutting through tumor. There was concern that we made an defect into the colon, so we the performed a proctoscopy.    A proctoscope was inserted into the rectum and with the sigmoid obstructed, air filled. The pelvis was filled with saline and no air bubbles were appreciated. All the air was then removed from the rectum and no defect identified. At this point, we decided to proceed with interstitial needle placement, which was carried out and dictated by Dr. Valera.     Following the procedure, we took another look with laparoscope and hemostasis was excellent. The pelvis was irrigated and then we placed Evicele on the dissection plane.     We then closed the fascia on the 10 mm port with 0-vicryl suture on UR needle. All laparoscopic instruments and ports were now removed and CO2 allowed to escape from the abdomen. Skin was reapproximated with 3-0 Vicryl sutures and appropriate dressing applied. Sponge, lap and needle counts were reported as correct x2 and instrument count was correct x1.      Dr Bae was present and scrubbed throughout the entire procedure.    Shira Goncalves MD  Gynecologic Oncology Fellow    Attending Attestation:  I was present and scrubbed for the entire surgical procedure.  I have reviewed and edited above note and agree with findings as documented.    Josefa Bae MD  Gynecologic Oncology  [facility]

## 2017-12-13 NOTE — PROGRESS NOTES
REGIONAL ANESTHESIA PAIN SERVICE EPIDURAL NOTE  SUBJECTIVE:   Interval History: Pt reports epidural rate had to be reduced yesterday postop due to BLE weakness, but then had more pain that has improved with analgesics as ordered..  Continues to have BLE numbness and weakness- able to bend knees but unable to straight leg raise, denies paresthesias, circumoral numbness, metallic taste or tinnitus.  Pt on bedrest.  Patient is currently denies;  tolerating diet.          Numerical Rating Scale:    Reports pain 5/10 at rest and 5/10 with movement.          Anticoagulation:  none    Medications related to Pain Management (Future)    Start     Dose/Rate Route Frequency Ordered Stop    12/14/17 0800  aspirin chewable tablet 81 mg      81 mg Oral DAILY 12/12/17 1504      12/12/17 1515  bupivacaine (MARCAINE) 0.125 % in NaCl 0.9 % 250 mL EPIDURAL Infusion      6 mL/hr  EPIDURAL CONTINUOUS 12/12/17 1504      12/12/17 1504  oxyCODONE IR (ROXICODONE) tablet 5-10 mg      5-10 mg Oral EVERY 4 HOURS PRN 12/12/17 1504      12/12/17 1504  HYDROmorphone (DILAUDID) injection 0.2-0.3 mg      0.2-0.3 mg Intravenous EVERY 2 HOURS PRN 12/12/17 1504      12/12/17 1504  acetaminophen (TYLENOL) tablet 650 mg      650 mg Oral EVERY 4 HOURS PRN 12/12/17 1504      12/12/17 0803  nalbuphine (NUBAIN) injection 2.5-5 mg      2.5-5 mg Intravenous EVERY 6 HOURS PRN 12/12/17 0803              OBJECTIVE:  Diagnostics:  Lab Results   Component Value Date    WBC 5.3 12/12/2017     Lab Results   Component Value Date    RBC 3.80 12/12/2017     Lab Results   Component Value Date    HGB 11.7 12/12/2017     Lab Results   Component Value Date    HCT 34.8 12/12/2017     Lab Results   Component Value Date     12/12/2017       No results found for: INR    Vitals:    Temp:  [95.1  F (35.1  C)-98.2  F (36.8  C)] 97.6  F (36.4  C)  Pulse:  [64-73] 68  Heart Rate:  [59-73] 70  Resp:  [14-20] 18  BP: (144-196)/(58-87) 144/58  SpO2:  [94 %-99 %] 98 %    Exam:     Strength 1/5 and symmetric grossly in bilateral LE, able to dorsi/plantar flex, bend knees, abduct and adduct, but unble to straight leg raise bilateral LE   Epidural site with dressing c/d/i, no tenderness, erythema, heme, edema      ASSESSMENT/PLAN:    Ginger Diaz is a 67 year old female POD #1 s/p INSERT INTERSTITIAL NEEDLE, ULTRASOUND GUIDED  LAPAROSCOPY OPERATIVE ADULT and placement of L4-5 epidural for analgesia.  Pt receiving adequate analgesia with epidural infusion Bbupivacaine 0.125% at 6mL/hour and analgesics as ordered.  Weakness affecting mobility, denies paresthesias, adequate sensory block.  No evidence of adverse side effects related to local anesthetic.  On bedrest    L4-5 epidural catheter placed 12/12/17 for postop pain management, catheter day #1  - continue current epidural infusion at 6mL/hour  - will continue to follow and adjust as needed    Anticoagulation. Contact [facility] prior to ordering    - discussed plan with attending anesthesiologist        ANITA Payne [facility]  Regional Anesthesia Pain Service  12/13/2017 9:26 AM    24 hour Job Code Pager.  For in-house use only.     Dial * * [phone] and  [place]:  [phone]  [place]: [phone]  Peds:  [phone]  Then enter call-back number  May text page using [employer], but NOT [employer].

## 2017-12-13 NOTE — PLAN OF CARE
Problem: Patient Care Overview  Goal: Plan of Care/Patient Progress Review  Outcome: Improving  Radiation treatment done this am, next one scheduled for 1445. Appliance intact, henriquez with marginal urine volumes, bedrest. Epidural. Tylenol and Oxycodone with adequate pain relief.AVSS, plan for discharge tomorrow after radiation treatments completed.  Patient will be at Radiation for bedside report.

## 2017-12-14 ENCOUNTER — ALLIED HEALTH/NURSE VISIT (OUTPATIENT)
Dept: RADIATION ONCOLOGY | Facility: CLINIC | Age: 67
DRG: 745 | End: 2017-12-14
Attending: RADIOLOGY
Payer: COMMERCIAL

## 2017-12-14 ENCOUNTER — ANESTHESIA (OUTPATIENT)
Dept: SURGERY | Facility: CLINIC | Age: 67
DRG: 745 | End: 2017-12-14
Payer: COMMERCIAL

## 2017-12-14 VITALS
WEIGHT: 240.3 LBS | DIASTOLIC BLOOD PRESSURE: 53 MMHG | SYSTOLIC BLOOD PRESSURE: 169 MMHG | OXYGEN SATURATION: 95 % | BODY MASS INDEX: 36.42 KG/M2 | HEIGHT: 68 IN | RESPIRATION RATE: 18 BRPM | TEMPERATURE: 98.8 F | HEART RATE: 81 BPM

## 2017-12-14 LAB
GLUCOSE BLDC GLUCOMTR-MCNC: 107 MG/DL (ref 70–99)
GLUCOSE BLDC GLUCOMTR-MCNC: 108 MG/DL (ref 70–99)
GLUCOSE BLDC GLUCOMTR-MCNC: 115 MG/DL (ref 70–99)
GLUCOSE BLDC GLUCOMTR-MCNC: 128 MG/DL (ref 70–99)
GLUCOSE BLDC GLUCOMTR-MCNC: 91 MG/DL (ref 70–99)

## 2017-12-14 PROCEDURE — 36000057 ZZH SURGERY LEVEL 3 1ST 30 MIN - UMMC: Performed by: RADIOLOGY

## 2017-12-14 PROCEDURE — 37000009 ZZH ANESTHESIA TECHNICAL FEE, EACH ADDTL 15 MIN: Performed by: RADIOLOGY

## 2017-12-14 PROCEDURE — 77280 THER RAD SIMULAJ FIELD SMPL: CPT | Performed by: RADIOLOGY

## 2017-12-14 PROCEDURE — 25000131 ZZH RX MED GY IP 250 OP 636 PS 637: Performed by: STUDENT IN AN ORGANIZED HEALTH CARE EDUCATION/TRAINING PROGRAM

## 2017-12-14 PROCEDURE — 77772 HDR RDNCL NTRSTL/ICAV BRCHTX: CPT | Mod: XE | Performed by: RADIOLOGY

## 2017-12-14 PROCEDURE — 25000131 ZZH RX MED GY IP 250 OP 636 PS 637: Performed by: NURSE PRACTITIONER

## 2017-12-14 PROCEDURE — 37000008 ZZH ANESTHESIA TECHNICAL FEE, 1ST 30 MIN: Performed by: RADIOLOGY

## 2017-12-14 PROCEDURE — 77772 HDR RDNCL NTRSTL/ICAV BRCHTX: CPT | Performed by: RADIOLOGY

## 2017-12-14 PROCEDURE — C1717 BRACHYTX, NON-STR,HDR IR-192: HCPCS | Performed by: RADIOLOGY

## 2017-12-14 PROCEDURE — 0WPJ41Z REMOVAL OF RADIOACTIVE ELEMENT FROM PELVIC CAVITY, PERCUTANEOUS ENDOSCOPIC APPROACH: ICD-10-PCS | Performed by: RADIOLOGY

## 2017-12-14 PROCEDURE — 77336 RADIATION PHYSICS CONSULT: CPT | Performed by: RADIOLOGY

## 2017-12-14 PROCEDURE — 40000171 ZZH STATISTIC PRE-PROCEDURE ASSESSMENT III: Performed by: RADIOLOGY

## 2017-12-14 PROCEDURE — 25000132 ZZH RX MED GY IP 250 OP 250 PS 637: Performed by: NURSE PRACTITIONER

## 2017-12-14 PROCEDURE — 82962 GLUCOSE BLOOD TEST: CPT | Mod: 91

## 2017-12-14 PROCEDURE — 25000128 H RX IP 250 OP 636: Performed by: NURSE ANESTHETIST, CERTIFIED REGISTERED

## 2017-12-14 PROCEDURE — 99024 POSTOP FOLLOW-UP VISIT: CPT | Mod: GC | Performed by: OBSTETRICS & GYNECOLOGY

## 2017-12-14 PROCEDURE — 36000059 ZZH SURGERY LEVEL 3 EA 15 ADDTL MIN UMMC: Performed by: RADIOLOGY

## 2017-12-14 PROCEDURE — 25000132 ZZH RX MED GY IP 250 OP 250 PS 637: Performed by: STUDENT IN AN ORGANIZED HEALTH CARE EDUCATION/TRAINING PROGRAM

## 2017-12-14 RX ORDER — OXYCODONE HYDROCHLORIDE 5 MG/1
5-10 TABLET ORAL EVERY 4 HOURS PRN
Qty: 25 TABLET | Refills: 0 | Status: SHIPPED | OUTPATIENT
Start: 2017-12-14

## 2017-12-14 RX ORDER — AMOXICILLIN 250 MG
1-2 CAPSULE ORAL 2 TIMES DAILY PRN
Qty: 60 TABLET | Refills: 0 | Status: SHIPPED | OUTPATIENT
Start: 2017-12-14

## 2017-12-14 RX ORDER — ACETAMINOPHEN 325 MG/1
650 TABLET ORAL EVERY 4 HOURS PRN
Qty: 100 TABLET | Refills: 0 | Status: SHIPPED | OUTPATIENT
Start: 2017-12-14 | End: 2017-12-14

## 2017-12-14 RX ORDER — SODIUM CHLORIDE 9 MG/ML
INJECTION, SOLUTION INTRAVENOUS CONTINUOUS PRN
Status: DISCONTINUED | OUTPATIENT
Start: 2017-12-14 | End: 2017-12-14

## 2017-12-14 RX ORDER — PROPOFOL 10 MG/ML
INJECTION, EMULSION INTRAVENOUS PRN
Status: DISCONTINUED | OUTPATIENT
Start: 2017-12-14 | End: 2017-12-14

## 2017-12-14 RX ORDER — LABETALOL HYDROCHLORIDE 5 MG/ML
INJECTION, SOLUTION INTRAVENOUS PRN
Status: DISCONTINUED | OUTPATIENT
Start: 2017-12-14 | End: 2017-12-14

## 2017-12-14 RX ORDER — ACETAMINOPHEN 325 MG/1
650 TABLET ORAL EVERY 4 HOURS PRN
Qty: 100 TABLET | Refills: 0 | Status: SHIPPED | OUTPATIENT
Start: 2017-12-14

## 2017-12-14 RX ADMIN — ACETAMINOPHEN 650 MG: 325 TABLET, FILM COATED ORAL at 06:35

## 2017-12-14 RX ADMIN — PROPOFOL 30 MG: 10 INJECTION, EMULSION INTRAVENOUS at 16:00

## 2017-12-14 RX ADMIN — SODIUM CHLORIDE: 9 INJECTION, SOLUTION INTRAVENOUS at 15:55

## 2017-12-14 RX ADMIN — DIMENHYDRINATE 50 MG: 50 TABLET ORAL at 07:42

## 2017-12-14 RX ADMIN — LEVOTHYROXINE SODIUM 25 MCG: 25 TABLET ORAL at 07:42

## 2017-12-14 RX ADMIN — LOSARTAN POTASSIUM 50 MG: 50 TABLET ORAL at 07:42

## 2017-12-14 RX ADMIN — METOPROLOL TARTRATE 25 MG: 25 TABLET ORAL at 16:50

## 2017-12-14 RX ADMIN — PROPOFOL 30 MG: 10 INJECTION, EMULSION INTRAVENOUS at 16:01

## 2017-12-14 RX ADMIN — LABETALOL HYDROCHLORIDE 10 MG: 5 INJECTION, SOLUTION INTRAVENOUS at 15:57

## 2017-12-14 RX ADMIN — ASPIRIN 81 MG CHEWABLE TABLET 81 MG: 81 TABLET CHEWABLE at 07:43

## 2017-12-14 RX ADMIN — PROPOFOL 20 MG: 10 INJECTION, EMULSION INTRAVENOUS at 16:03

## 2017-12-14 RX ADMIN — OXYCODONE HYDROCHLORIDE 10 MG: 5 TABLET ORAL at 09:54

## 2017-12-14 RX ADMIN — METOPROLOL TARTRATE 25 MG: 25 TABLET ORAL at 07:41

## 2017-12-14 RX ADMIN — MECLIZINE 25 MG: 25 TABLET ORAL at 07:43

## 2017-12-14 ASSESSMENT — PAIN DESCRIPTION - DESCRIPTORS
DESCRIPTORS: CRAMPING
DESCRIPTORS: CRAMPING;PRESSURE

## 2017-12-14 ASSESSMENT — LIFESTYLE VARIABLES: TOBACCO_USE: 0

## 2017-12-14 NOTE — PLAN OF CARE
Problem: Patient Care Overview  Goal: Plan of Care/Patient Progress Review  Pt A/O, VSS on RA. Tylenol given for pain relief. Denied nausea overnight. Interstitial needles in place. De Jesus in place with adequate UO. Epidural @ 6 cc/hr. Abdominal lap sites CDI. NPO @ 0800, pt has final 2 radiation treatments today. Maya cares completed. No BM overnight. Daughter sleeping @ bedside. Okay to wake pt for bedside report. Calling appropriately and making needs known. Will continue to monitor and follow POC.

## 2017-12-14 NOTE — PLAN OF CARE
Problem: Patient Care Overview  Goal: Plan of Care/Patient Progress Review  Outcome: Improving  Pain: pt pain managed epidural 6cc/hr and PO PRN Oxycodone 10 mg   VSS  Output: Adequate output from bladder catheter, clear and yellow in color.   Diet: NPO   Bowel Function: passing flatus, lass BM 12/12/17  Lines:   Activity: Bedrest due to brachytherapy   Skin: Catheter/Maya cares performed. Closed blister on left buttock and open blisters along medial buttock, pt reports blisters to have been present prior to hospital stay, MD notified   Additional Notes: Prior to second round of radiation pt refused spencer wipe scrub x 2, , metropolol held per NP verbal orders. 3C receiving and charge RN notified

## 2017-12-14 NOTE — ANESTHESIA POSTPROCEDURE EVALUATION
Patient: Ginger Diaz    Procedure(s):  Removal of Interstitial Needle - Wound Class: II-Clean Contaminated    Diagnosis:Endometrial Cancer   Diagnosis Additional Information: No value filed.    Anesthesia Type:  General, ETT    Note:  Anesthesia Post Evaluation    Patient location during evaluation: Phase 2  Patient participation: Able to fully participate in evaluation  Level of consciousness: awake and alert  Pain management: adequate  Airway patency: patent  Cardiovascular status: acceptable  Respiratory status: acceptable  Hydration status: acceptable  PONV: none     Anesthetic complications: None          Last vitals:  Vitals:    12/14/17 1138 12/14/17 1536 12/14/17 1641   BP: 148/59  169/53   Pulse:   81   Resp: 16 18 18   Temp: 36.3  C (97.4  F)  37.1  C (98.8  F)   SpO2: 95% 100% 95%         Electronically Signed By: Delmi Perkins MD  December 14, 2017  4:54 PM

## 2017-12-14 NOTE — PROGRESS NOTES
REGIONAL ANESTHESIA PAIN SERVICE EPIDURAL NOTE  SUBJECTIVE:   Interval History: Pt reports pain tolerable, she took acetaminophen this AM, and tried to avoid oxycodone because of constipation, but since returning from radiation therapy this AM has more perineal pain so will consider taking something for pain.   Pt reports she is satisfied with pain control via epidural and prefers the lower rate of 6 mL/hr due numbness and weakness she is experiencing.  Can bend her knees but unable to straight leg raise, denies paresthesias, circumoral numbness, metallic taste or tinnitus.  Pt on bedrest.  Patient is currently without nausea.  Currently NPO for OR later today.     Clinically Aligned Pain Assessment (CAPA):  Comfort (How is your pain?): Tolerable with discomfort  Change in Pain (Since your last medication/intervention?): Getting worse  Pain Control (How are your pain treatments working?):  Partially effective pain control  Functioning (Are you able to do activities to get better?) : Can do most things, but pain gets in the way of some     Anticoagulation:  Last dose enoxaparin administered 12/13/17 at 1043    Medications related to Pain Management (Future)    Start     Dose/Rate Route Frequency Ordered Stop    12/14/17 0800  aspirin chewable tablet 81 mg      81 mg Oral DAILY 12/12/17 1504      12/14/17 0000  oxyCODONE IR (ROXICODONE) 5 MG tablet      5-10 mg Oral EVERY 4 HOURS PRN 12/14/17 0838      12/14/17 0000  acetaminophen (TYLENOL) 325 MG tablet      650 mg Oral EVERY 4 HOURS PRN 12/14/17 0857      12/13/17 2201  LORazepam (ATIVAN) tablet 0.5 mg      0.5 mg Oral AT BEDTIME PRN 12/13/17 2201      12/12/17 1515  bupivacaine (MARCAINE) 0.125 % in NaCl 0.9 % 250 mL EPIDURAL Infusion      6 mL/hr  EPIDURAL CONTINUOUS 12/12/17 1504      12/12/17 1504  oxyCODONE IR (ROXICODONE) tablet 5-10 mg      5-10 mg Oral EVERY 4 HOURS PRN 12/12/17 1504      12/12/17 1504  HYDROmorphone (DILAUDID) injection 0.2-0.3 mg       0.2-0.3 mg Intravenous EVERY 2 HOURS PRN 12/12/17 1504      12/12/17 1504  acetaminophen (TYLENOL) tablet 650 mg      650 mg Oral EVERY 4 HOURS PRN 12/12/17 1504              OBJECTIVE:  Diagnostics:  Lab Results   Component Value Date    WBC 5.3 12/12/2017     Lab Results   Component Value Date    RBC 3.80 12/12/2017     Lab Results   Component Value Date    HGB 11.7 12/12/2017     Lab Results   Component Value Date    HCT 34.8 12/12/2017     Lab Results   Component Value Date     12/12/2017       No results found for: INR    Vitals:    Temp:  [96.4  F (35.8  C)-98.9  F (37.2  C)] 98.9  F (37.2  C)  Pulse:  [60-70] 70  Heart Rate:  [60-70] 70  Resp:  [16-18] 18  BP: (139-169)/(50-64) 169/60  SpO2:  [94 %-100 %] 97 %    Exam:    Strength 5/5 and symmetric grossly in bilateral LE   Epidural site with dressing c/d/i, no tenderness, erythema, heme, edema      ASSESSMENT/PLAN:    Ginger Diaz is a 67 year old female POD #1 s/p INSERT INTERSTITIAL NEEDLE, ULTRASOUND GUIDED  LAPAROSCOPY OPERATIVE ADULT and placement of L4-5 epidural for analgesia.  Pt receiving adequate analgesia with epidural infusion Bbupivacaine 0.125% at 6mL/hour and analgesics as ordered.  Weakness affecting mobility, denies paresthesias, adequate sensory block.  No evidence of adverse side effects related to local anesthetic.  On bedrest.    L4-5 epidural catheter placed 12/12/17 for postop pain management, catheter day #2  - continue current epidural infusion at 6mL/hour today, plan to remove catheter at the end of OR case scheduled this afternoon   - pt reporting increase pain but declines any rate change due to effect of local anesthetic on LE strength.  Discussed with bedside nures that it is okay to give PRN opioid as ordered  - will continue to follow and adjust as needed    Anticoagulation. Okay to resume SC enoxaparin 2 hrs after epidural catheter removed during surgery    - discussed plan with attending  anesthesiologist        Tameka Rice, ANITA [facility]  Regional Anesthesia Pain Service  12/14/2017 10:28 AM    24 hour Job Code Pager.  For in-house use only.     Dial * * *[phone] and  [place]:  [phone]  [place]: [phone]  Peds:  [phone]  Then enter call-back number  May text page using [employer], but NOT [employer].

## 2017-12-14 NOTE — ANESTHESIA PREPROCEDURE EVALUATION
Anesthesia Evaluation     . Pt has had prior anesthetic. Type: General, MAC and Regional    History of anesthetic complications   - PONV  Slow to wake      ROS/MED HX    ENT/Pulmonary: Comment: Neg sleep study    (+)Treatment: Inhaler prn,  , . .   (-) tobacco use and sleep apneaAsthma:  No issues for years.   Neurologic:     (+)other neuro History of Bell's palsy in 2016 right face, some right face numbness    Cardiovascular:     (+) Dyslipidemia, hypertension----. : . . . :. Other, . Previous cardiac testing Echodate:7/5/17results:Final Conclusion   The ejection fraction is visually estimated at 60-65%.   Mild left ventricular hypertrophy.   Mild left atrial enlargement.   No significant valvular heart disease noted.   Estimated EF: 60-65%  date: results:ECG reviewed date:2017 results:SB date: results:         (-) taking anticoagulants/antiplateletsDysrhythmias:  SVT, medically managed.   METS/Exercise Tolerance: Comment: Able to walk one block 1 - Eating, dressing   Hematologic:  - neg hematologic  ROS       Musculoskeletal:   (+) arthritis, , , -       GI/Hepatic:  - neg GI/hepatic ROS       Renal/Genitourinary:  - ROS Renal section negative       Endo:     (+) type II DM Last HgA1c: 7.8 date: 10/30/17 Using insulin - not using insulin pump Diabetic complications: neuropathy, thyroid problem hypothyroidism, Obesity, .      Psychiatric:     (+) psychiatric history anxiety and bipolar      Infectious Disease:  - neg infectious disease ROS       Malignancy:   (+) Malignancy History of Other  Other CA recurrent endometrial cancer Active status post Surgery and Radiation         Other:    (+) No chance of pregnancy C-spine cleared: N/A, no H/O Chronic Pain,no other significant disability                    Physical Exam  Normal systems: cardiovascular, pulmonary and dental    Airway   Mallampati: II  TM distance: >3 FB  Neck ROM: full    Dental     Cardiovascular   Rhythm and rate: regular and normal      Pulmonary                      Anesthesia Plan      History & Physical Review  History and physical reviewed and following examination; no interval change.    ASA Status:  3 .        Plan for General and ETT with Intravenous and Propofol induction. Maintenance will be Balanced.    PONV prophylaxis:  Ondansetron (or other 5HT-3)       Postoperative Care  Postoperative pain management:  IV analgesics.      Consents  Anesthetic plan, risks, benefits and alternatives discussed with:  Patient.  Use of blood products discussed: No .   .                          .

## 2017-12-14 NOTE — OP NOTE
DIAGNOSIS: Recurrent Endometrial Cancer  NAME OF THE OPERATION:   Removal of HDR interstitial needles and template under MAC anesthesia       SURGEONS: Adryan Zambrano MD Radiation Oncology   Assistant: Brian Cheatham MD resident     ANESTHESIA: MAC  ESTIMATED BLOOD LOSS: 100 cc  COMPLICATIONS: None.   INDICATION: Recurrent Endometrial Cancer, need to remove interstitial needles    OPERATIVE PROCEDURE: The patient was brought to the operating room wearing compression stockings and pneumo-boots and identified to be herself. The patient was placed under MAC anesthesia and ventilated with an oxygen mask . She was then placed in a frog legged position. The Coloplast was removed from her inner thighs.  A total of 4 sutures were removed which held the HDR template in place. The sutures holding the collar and vaginal cylinder were likewise cut and removed. The template was removed with the interstitial needles and the vaginal cylinder from her perineal area.  Approximately 100cc of bleeding occurred.  Compression was applied and bleeding stopped within 10 minutes. Because of slight vaginal oozing, a single Kerlix was left inside the vagina to stop bleeding. Anesthesia was reversed, and she was transferred to the floor in stable position with the De Jesus catheter in place.  PLAN: Discharge, possibly tonight, per Gyn-Onc service.      I was present and supervised the entire procedure.    Adryan Zambrano MD

## 2017-12-14 NOTE — ANESTHESIA CARE TRANSFER NOTE
Patient: Ginger Diaz    Procedure(s):  Removal of Interstitial Needle - Wound Class: II-Clean Contaminated    Diagnosis: Endometrial Cancer   Diagnosis Additional Information: No value filed.    Anesthesia Type:   General, ETT     Note:  Airway :Room Air  Patient transferred to:Medical/Surgical Unit  Comments: Anesthesia Care Transfer Note    Patient: Ginger Diaz    Transferred to: 7c    Patient vital signs: stable    Airway: none    Monitors on, VSS, pt. Stable, Report called to 7c RN.     Blaze Manriquez CRNA  12/14/2017 4:24 PM      Handoff Report: Identifed the Patient, Identified the Reponsible Provider, Reviewed the pertinent medical history, Discussed the surgical course, Reviewed Intra-OP anesthesia mangement and issues during anesthesia, Set expectations for post-procedure period and Allowed opportunity for questions and acknowledgement of understanding      Vitals: (Last set prior to Anesthesia Care Transfer)    CRNA VITALS  12/14/2017 1554 - 12/14/2017 1624      12/14/2017             Resp Rate (set): 10                Electronically Signed By: ANITA Jay CRNA  December 14, 2017  4:24 PM

## 2017-12-14 NOTE — PROGRESS NOTES
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     Subjective: ---  Pain overall well controlled. No N/V.  Legs feel better today. Anxious to go home.      Objective:   Temp: 98.8  F (37.1  C) Temp  Min: 96.4  F (35.8  C)  Max: 98.8  F (37.1  C)  Resp: 16 Resp  Min: 16  Max: 18  SpO2: 97 % SpO2  Min: 94 %  Max: 100 %  Pulse: 70 Pulse  Min: 60  Max: 70  Heart Rate: 65 Heart Rate  Min: 60  Max: 65  BP: 163/54 Systolic (24hrs), Av , Min:139 , Max:163   Diastolic (24hrs), Av, Min:50, Max:64    I/O last 3 completed shifts:  In: 1005 [P.O.:765; I.V.:240]  Out: 1180 [Urine:1180]    General:  A&Ox3, NAD  CV:  RRR, no m/r/g  Pulm:  CTAB, good inspiratory effort  Neuro: appropriately reduced strength secondary to epidural, improved sensation RLE, intact and now symmetric b/l  Abd: soft, appropriately tender to palpation, nondistended.  No rebound or guarding.  : interstitial needles in place, no bleeding noted  Incisions: c/d/i with overlying dermabond  LE: 1+ pitting edema, no calf tenderness    New labs/imaging-  None     Assessment: 67 year old POD#2 s/p LSC guided insertion interstitial needles for vaginal cuff brachytherapy, planned removal today     Dz: recurrent stage IB grade 1 endometrioid adenocarcinoma currently undergoing radiation therapy for vaginal recurrence  FEN: NPO for procedure   Pain: epidural, tylenol, oxy, IV dilaudid bumps  Heme: No issues  CV: HTN; losartan, metoprolol  Pulm: asthma; albuterol prn  GI: regular diet post procedure   : henriquez in place to be removed post-procedure  ID: NI  Endo: T2DM; 40U lantus HS, med SSI, metformin & glipizide held - lantus held night of  prior to procedure , cont. SSI prn; Hypothyroidism; levothyroxine  Psych/Neuro: Bipolar d/o - lamotrigine and paroxetine held; meniere's - meclizine,  dramamine, and zofran prn; anxiety - ativan HS  MSK: NI  PPX: SCD, IS, lovenox   Dispo: dc after brachytherapy likely 12/14  Drains/Lines: PIV, interstitial needles, henriquez, epidural    Navid Ruelas MD, PGY4  December 14, 2017 4:41 AM     IMatt personally examined and evaluated this patient on 12/14/17.  I discussed the patient with the resident and care team, and agree with the assessment and plan of care as documented in the residents note above.    I personally reviewed vital signs, laboratory values and imaging results.    Pt with recurrent endometrial cancer admitted for interstitial brachytherapy.  Plan for d/c home after removal of needles.    Talia Walker MD  Gynecologic Oncology  [facility]

## 2017-12-14 NOTE — PLAN OF CARE
Problem: Patient Care Overview  Goal: Plan of Care/Patient Progress Review  Outcome: No Change  Patient returned from radiation in stable condition. VSS. Perineal pain managed with Oxycodone and Tylenol. Epidural also infusing. Maya cares done. Patient tolerating log roll/HOB restrictions well. On regular DM diet. BG checked per orders. No insulin given.     Clear liquids at midnight, NPO at 0800. Final 2 radiation treatments tomorrow. Continue with POC.     Okay to wake patient for bedside report.

## 2017-12-15 ENCOUNTER — CARE COORDINATION (OUTPATIENT)
Dept: RADIATION ONCOLOGY | Facility: CLINIC | Age: 67
End: 2017-12-15

## 2017-12-15 NOTE — PROGRESS NOTES
Called patient to follow up after treatment and discharge from hospital.  She states she is doing well, just very tired. Denies any pain or discomfort.  Pt will follow up with her GYN in 4-6 weeks.

## 2017-12-15 NOTE — PLAN OF CARE
Problem: Patient Care Overview  Goal: Plan of Care/Patient Progress Review  Outcome: Adequate for Discharge Date Met: 12/15/17  AVSS. Denied pain, declined pain meds. Abdominal incisions CDI. Bowel sounds present. Passing gas, no BM. Voided spontaneously post henriquez removal. Vaginal packing was removed. Ambulated without difficulty. PIV removed. AVS reviewed with patient. Left unit via wheelchair for the lobby.

## 2017-12-19 ENCOUNTER — ONCOLOGY VISIT (OUTPATIENT)
Dept: RADIATION ONCOLOGY | Facility: CLINIC | Age: 67
End: 2017-12-19

## 2017-12-19 NOTE — MR AVS SNAPSHOT
After Visit Summary   2017    Ginger Diaz    MRN: [id number]           Patient Information     Date Of Birth          1950        Visit Information        Provider Department      2017 10:00 PM Ema Valera MD Radiation Oncology Clinic         Follow-ups after your visit        Who to contact     Please call your clinic at [phone] to:    Ask questions about your health    Make or cancel appointments    Discuss your medicines    Learn about your test results    Speak to your doctor   If you have compliments or concerns about an experience at your clinic, or if you wish to file a complaint, please contact [facility] Patient Relations at [phone] or email us at [email or web address]         Additional Information About Your Visit        MyChart Information     [employer] is an electronic gateway that provides easy, online access to your medical records. With [employer], you can request a clinic appointment, read your test results, renew a prescription or communicate with your care team.     To sign up for [employer] visit the website at [email or web address]   You will be asked to enter the access code listed below, as well as some personal information. Please follow the directions to create your username and password.     Your access code is: [email or web address]  Expires: 2018  9:36 AM     Your access code will  in 90 days. If you need help or a new code, please contact your [facility] or call [phone] for assistance.        Care EveryWhere ID     This is your Care EveryWhere ID. This could be used by other organizations to access your [facility] medical records  [id number]         Blood Pressure from Last 3 Encounters:   No data found for BP    Weight from Last 3 Encounters:   No data found for Wt              Today, you had the following     No orders found for display       Primary Care  Provider Office Phone # Fax #    Casey Baez [phone]       [facility] [address] [place] [address]        Equal Access to Services     LEOLA LINARES : Mustapha tricia ku suhailo Soomaali, waaxda luqadaha, qaybta kaalmada adeegyada, josé luis corbettmeghan dillon. So [facility] [phone].    ATENCIÓN: Si habla español, tiene a jenkins disposición servicios gratuitos de asistencia lingüística. Llame al [phone].    We comply with applicable federal civil rights laws and [place] laws. We do not discriminate on the basis of race, color, national origin, age, disability, sex, sexual orientation, or gender identity.            Thank you!     Thank you for choosing [facility]  for your care. Our goal is always to provide you with excellent care. Hearing back from our patients is one way we can continue to improve our services. Please take a few minutes to complete the written survey that you may receive in the mail after your visit with us. Thank you!             Your Updated Medication List - Protect others around you: Learn how to safely use, store and throw away your medicines at www.disposemymeds.org.          This list is accurate as of: 12/19/17 11:59 PM.  Always use your most recent med list.                   Brand Name Dispense Instructions for use Diagnosis    acetaminophen 325 MG tablet    TYLENOL    100 tablet    Take 2 tablets (650 mg) by mouth every 4 hours as needed for mild pain or fever    Recurrent carcinoma of endometrium (H)       ASPIRIN PO      Take 81 mg by mouth daily        ATIVAN PO      Take 0.5 mg by mouth as needed for anxiety        COZAAR PO      Take 50 mg by mouth every morning        GLIPIZIDE PO      Take 10 mg by mouth 2 times daily (before meals)        hydrochlorothiazide 12.5 MG capsule    MICROZIDE     Take 25 mg by mouth daily        LAMICTAL PO      100 mg in the AM, 200 mg at HS        LANTUS SOLOSTAR SC      Inject  40 Units Subcutaneous At Bedtime        LEVOTHYROXINE SODIUM PO      Take 25 mcg by mouth daily        loperamide 2 MG capsule    IMODIUM     Take 4 mg by mouth daily as needed for diarrhea        MECLIZINE HCL PO      Take 25 mg by mouth daily        metFORMIN 500 MG 24 hr tablet    GLUCOPHAGE-XR     Take 500 mg by mouth daily (with dinner)        METOPROLOL TARTRATE PO      Take 25 mg by mouth 3 times daily        oxyCODONE IR 5 MG tablet    ROXICODONE    25 tablet    Take 1-2 tablets (5-10 mg) by mouth every 4 hours as needed for moderate to severe pain    Recurrent carcinoma of endometrium (H)       PAXIL PO      Take 40 mg by mouth daily        senna-docusate 8.6-50 MG per tablet    SENOKOT-S;PERICOLACE    60 tablet    Take 1-2 tablets by mouth 2 times daily as needed for constipation    Recurrent carcinoma of endometrium (H)

## 2017-12-30 NOTE — PROCEDURES
RADIOTHERAPY TREATMENT SUMMARY          DATE OF REPORT: 2017    PATIENT: CASSIDY PIKE  MEDICAL RECORD NO: [id number]  : 1950    DIAGNOSIS: Recurrent endometrial cancer, (initial stage FIGO 1B, grade 1) C54.1 Malignant neoplasm of endometrium  INTENT OF RADIOTHERAPY: Local Control  CONCURRENT SYSTEMIC THERAPY: None                  TREATMENT SUMMARY:  Details of the treatments summarized below are found in records kept in the Department of Radiation Oncology at [facility].    Radiation Oncology - Course: 1 via interstitial   HDR brachytherapy  Treatment Site Dose Modality From To Days Fx.  Vaginal Cuff 2,250 cGy  2017 2 5    Dose per Fraction: 450 cGy per fraction Total Dose: 2,250 cGy with brachytherapy. With EBRT (4,500 cGy/28 fractions to central pelvis)      COMMENTS:   Ms. Pike is a 67 year old woman with vaginal cuff recurrence of endometrial cancer. The mass, as visualized on PET/CT and pelvic MRI, was abutting the bladder without clear mucosal involvement, and was also immediately adjacent to the sigmoid colon. She underwent cystoscopy and proctoscopy without evidence of invasion of the bladder or distal colon mucosa. She therefore underwent external beam radiotherapy to the pelvis at [facility] radiation therapy by Dr. Goldy Bullock.  We recommended interstitial HDR brachytherapy boost, which was delivered as described above. Placement of the interstitial needles with laparoscopic guidance demonstrated the sigmoid colon to be densely adherent to the vaginal cuff mass with the possibility of tumor involvement. Needles were placed with within the tumor volume without inclusion of the sigmoid colon.  She had no symptoms by CTCAE with the exception of post-operative nausea, vomiting and pain (managed during her inpatient stay by gynecologic oncology).    PAIN MANAGEMENT: As per her inpatient team. No likely need for outpatient narcotics may be necessary for  postoperative pain.    FOLLOW UP PLAN:  Coordinate follow up with GynOnc  and Dr. Bullock.    Resident Physician: Brian Cheatham M.D.   Staff Physician: Ema Valera M.D.  Physicist: Jourdan Holloway, PhD    CC:  MD Josefa Caputo MD Ross Dickson, MD Annie Tan, MD

## (undated) DEVICE — PREP CHLORAPREP 26ML TINTED ORANGE  260815

## (undated) DEVICE — GLOVE PROTEXIS W/NEU-THERA 6.0  2D73TE60

## (undated) DEVICE — COVER ULTRASOUND PROBE W/GEL FLEXI-FEEL 6"X58" LF  25-FF658

## (undated) DEVICE — DRSG ULCER ALGINATE COMFEEL PLUS 8X8" SQUARE 3120

## (undated) DEVICE — ESU GROUND PAD ADULT W/CORD E7507

## (undated) DEVICE — GOWN XLG DISP 9545

## (undated) DEVICE — PANTIES MESH LG/XLG 2PK 706M2

## (undated) DEVICE — SU DERMABOND ADVANCED .7ML DNX12

## (undated) DEVICE — SU MONOCRYL 4-0 PS-2 27" UND Y426H

## (undated) DEVICE — SOL WATER IRRIG 1000ML BOTTLE 2F7114

## (undated) DEVICE — KIT PATIENT POSITIONING PIGAZZI LATEX FREE 40580

## (undated) DEVICE — SU VICRYL 0 UR-6 27" J603H

## (undated) DEVICE — BLADE CLIPPER SGL USE 9680

## (undated) DEVICE — DRSG KERLIX 2 1/4"X3YDS ROLL 6720

## (undated) DEVICE — GLOVE PROTEXIS BLUE W/NEU-THERA 6.0  2D73EB60

## (undated) DEVICE — DRAPE LEGGINGS CLEAR 8430

## (undated) DEVICE — ESU ENDO SCISSORS 5MM CVD 5DCS

## (undated) DEVICE — ENDO SHEARS 5MM 176643

## (undated) DEVICE — DRSG KERLIX 4 1/2"X4YDS ROLL 6715

## (undated) DEVICE — DRSG PRIMAPORE 02X3" 7133

## (undated) DEVICE — LINEN TOWEL PACK X6 WHITE 5487

## (undated) DEVICE — SU ETHIBOND 0 CT-2 30" X412H

## (undated) DEVICE — SPONGE RAY-TEC 4X8" 7318

## (undated) DEVICE — DRAPE SHEET MED 44X70" 9355

## (undated) DEVICE — ENDO TROCAR BLUNT TIP KII BALLOON 12X100MM C0R47

## (undated) DEVICE — PAD CHUX UNDERPAD 23X24" 7136

## (undated) DEVICE — CATH FOLEY 24FR 5ML SILICONE LUBRI-SIL 175824

## (undated) DEVICE — Device

## (undated) DEVICE — GLOVE PROTEXIS MICRO 6.0  2D73PM60

## (undated) DEVICE — CATH PLUG W/CAP 000076

## (undated) DEVICE — ENDO CANNULA 05MM VERSAONE UNIVERSAL UNVCA5STF

## (undated) DEVICE — NDL COUNTER 20CT 31142493

## (undated) DEVICE — DRAPE BACK TABLE  44X90" 8377

## (undated) DEVICE — PITCHER STERILE 1000ML  SSK9004A

## (undated) DEVICE — SYR PISTON URETHRAL 60ML 68000

## (undated) DEVICE — WIPES FOLEY CARE SURESTEP PROVON DFC100

## (undated) DEVICE — ESU LIGASURE MARYLAND VESSEL LAP 44CM X-LONG LF1744

## (undated) DEVICE — LINEN TOWEL PACK X5 5464

## (undated) DEVICE — SOL NACL 0.9% INJ 1000ML BAG 07983-09

## (undated) DEVICE — ENDO SCOPE WARMER SEAL  C3101

## (undated) DEVICE — SUCTION TIP YANKAUER STR K87

## (undated) DEVICE — PREP TECHNI-CARE CHLOROXYLENOL 3% 4OZ BOTTLE C222-4ZWO

## (undated) DEVICE — SPONGE PACK VAGINAL 2"X9

## (undated) DEVICE — LINEN TOWEL PACK X30 5481

## (undated) DEVICE — SUCTION IRR STRYKERFLOW II W/TIP 250-070-520

## (undated) DEVICE — JELLY LUBRICATING SURGILUBE 2OZ TUBE

## (undated) DEVICE — SOL NACL 0.9% IRRIG 1000ML BOTTLE 2F7124

## (undated) DEVICE — ESU PENCIL W/COATED BLADE E2450H

## (undated) DEVICE — GLOVE SENSICARE 7.0 MSG1070 LATEX FREE

## (undated) DEVICE — GLOVE PROTEXIS W/NEU-THERA 6.5  2D73TE65

## (undated) RX ORDER — FENTANYL CITRATE 50 UG/ML
INJECTION, SOLUTION INTRAMUSCULAR; INTRAVENOUS
Status: DISPENSED
Start: 2017-12-12

## (undated) RX ORDER — LABETALOL HYDROCHLORIDE 5 MG/ML
INJECTION, SOLUTION INTRAVENOUS
Status: DISPENSED
Start: 2017-12-12

## (undated) RX ORDER — DIPHENHYDRAMINE HYDROCHLORIDE 50 MG/ML
INJECTION INTRAMUSCULAR; INTRAVENOUS
Status: DISPENSED
Start: 2017-12-12

## (undated) RX ORDER — DEXAMETHASONE SODIUM PHOSPHATE 4 MG/ML
INJECTION, SOLUTION INTRA-ARTICULAR; INTRALESIONAL; INTRAMUSCULAR; INTRAVENOUS; SOFT TISSUE
Status: DISPENSED
Start: 2017-12-12

## (undated) RX ORDER — CEFAZOLIN SODIUM 2 G/100ML
INJECTION, SOLUTION INTRAVENOUS
Status: DISPENSED
Start: 2017-12-12

## (undated) RX ORDER — ONDANSETRON 2 MG/ML
INJECTION INTRAMUSCULAR; INTRAVENOUS
Status: DISPENSED
Start: 2017-12-12

## (undated) RX ORDER — PROPOFOL 10 MG/ML
INJECTION, EMULSION INTRAVENOUS
Status: DISPENSED
Start: 2017-12-14

## (undated) RX ORDER — NEOSTIGMINE METHYLSULFATE 1 MG/ML
VIAL (ML) INJECTION
Status: DISPENSED
Start: 2017-12-12

## (undated) RX ORDER — GLYCOPYRROLATE 0.2 MG/ML
INJECTION, SOLUTION INTRAMUSCULAR; INTRAVENOUS
Status: DISPENSED
Start: 2017-12-12

## (undated) RX ORDER — ALBUTEROL SULFATE 90 UG/1
AEROSOL, METERED RESPIRATORY (INHALATION)
Status: DISPENSED
Start: 2017-12-12

## (undated) RX ORDER — METOPROLOL TARTRATE 1 MG/ML
INJECTION, SOLUTION INTRAVENOUS
Status: DISPENSED
Start: 2017-12-12

## (undated) RX ORDER — LABETALOL HYDROCHLORIDE 5 MG/ML
INJECTION, SOLUTION INTRAVENOUS
Status: DISPENSED
Start: 2017-12-14

## (undated) RX ORDER — PROPOFOL 10 MG/ML
INJECTION, EMULSION INTRAVENOUS
Status: DISPENSED
Start: 2017-12-12